# Patient Record
Sex: FEMALE | Race: WHITE | HISPANIC OR LATINO | URBAN - METROPOLITAN AREA
[De-identification: names, ages, dates, MRNs, and addresses within clinical notes are randomized per-mention and may not be internally consistent; named-entity substitution may affect disease eponyms.]

---

## 2019-11-03 ENCOUNTER — INPATIENT (INPATIENT)
Facility: HOSPITAL | Age: 49
LOS: 11 days | Discharge: HOME | End: 2019-11-15
Attending: OBSTETRICS & GYNECOLOGY | Admitting: OBSTETRICS & GYNECOLOGY
Payer: MEDICAID

## 2019-11-03 VITALS
SYSTOLIC BLOOD PRESSURE: 107 MMHG | OXYGEN SATURATION: 100 % | RESPIRATION RATE: 20 BRPM | HEART RATE: 65 BPM | TEMPERATURE: 98 F | DIASTOLIC BLOOD PRESSURE: 66 MMHG

## 2019-11-03 NOTE — ED ADULT TRIAGE NOTE - CHIEF COMPLAINT QUOTE
Pt. brought to [facility] ED by friend for abdominal pain that started Monday. Pt. was seen by PMD on monday and started on antibiotic for UTI. Pt. has become worse. Pt. is Icelandic speaking and is comfortable with friend translating. Pt. appears uncomfortable at triage.

## 2019-11-04 DIAGNOSIS — N12 TUBULO-INTERSTITIAL NEPHRITIS, NOT SPECIFIED AS ACUTE OR CHRONIC: ICD-10-CM

## 2019-11-04 DIAGNOSIS — Z98.891 HISTORY OF UTERINE SCAR FROM PREVIOUS SURGERY: Chronic | ICD-10-CM

## 2019-11-04 DIAGNOSIS — N70.93 SALPINGITIS AND OOPHORITIS, UNSPECIFIED: ICD-10-CM

## 2019-11-04 DIAGNOSIS — Z98.51 TUBAL LIGATION STATUS: Chronic | ICD-10-CM

## 2019-11-04 LAB
ALBUMIN SERPL ELPH-MCNC: 3.7 G/DL — SIGNIFICANT CHANGE UP (ref 3.3–5)
ALP SERPL-CCNC: 110 U/L — SIGNIFICANT CHANGE UP (ref 40–120)
ALT FLD-CCNC: 26 U/L — SIGNIFICANT CHANGE UP (ref 4–33)
ANION GAP SERPL CALC-SCNC: 15 MMO/L — HIGH (ref 7–14)
APPEARANCE UR: CLEAR — SIGNIFICANT CHANGE UP
APTT BLD: 29.3 SEC — SIGNIFICANT CHANGE UP (ref 27.5–36.3)
AST SERPL-CCNC: 33 U/L — HIGH (ref 4–32)
BACTERIA # UR AUTO: NEGATIVE — SIGNIFICANT CHANGE UP
BASE EXCESS BLDV CALC-SCNC: 1.4 MMOL/L — SIGNIFICANT CHANGE UP
BASOPHILS # BLD AUTO: 0.04 K/UL — SIGNIFICANT CHANGE UP (ref 0–0.2)
BASOPHILS NFR BLD AUTO: 0.4 % — SIGNIFICANT CHANGE UP (ref 0–2)
BILIRUB SERPL-MCNC: < 0.2 MG/DL — LOW (ref 0.2–1.2)
BILIRUB UR-MCNC: NEGATIVE — SIGNIFICANT CHANGE UP
BLD GP AB SCN SERPL QL: NEGATIVE — SIGNIFICANT CHANGE UP
BLOOD GAS VENOUS - CREATININE: 0.73 MG/DL — SIGNIFICANT CHANGE UP (ref 0.5–1.3)
BLOOD GAS VENOUS - FIO2: 21 — SIGNIFICANT CHANGE UP
BLOOD UR QL VISUAL: NEGATIVE — SIGNIFICANT CHANGE UP
BUN SERPL-MCNC: 9 MG/DL — SIGNIFICANT CHANGE UP (ref 7–23)
C TRACH RRNA SPEC QL NAA+PROBE: SIGNIFICANT CHANGE UP
CALCIUM SERPL-MCNC: 9.3 MG/DL — SIGNIFICANT CHANGE UP (ref 8.4–10.5)
CHLORIDE BLDV-SCNC: 104 MMOL/L — SIGNIFICANT CHANGE UP (ref 96–108)
CHLORIDE SERPL-SCNC: 98 MMOL/L — SIGNIFICANT CHANGE UP (ref 98–107)
CO2 SERPL-SCNC: 22 MMOL/L — SIGNIFICANT CHANGE UP (ref 22–31)
COLOR SPEC: YELLOW — SIGNIFICANT CHANGE UP
CREAT SERPL-MCNC: 0.71 MG/DL — SIGNIFICANT CHANGE UP (ref 0.5–1.3)
EOSINOPHIL # BLD AUTO: 0.03 K/UL — SIGNIFICANT CHANGE UP (ref 0–0.5)
EOSINOPHIL NFR BLD AUTO: 0.3 % — SIGNIFICANT CHANGE UP (ref 0–6)
FIBRINOGEN PPP-MCNC: 1049.2 MG/DL — HIGH (ref 350–510)
GAS PNL BLDV: 134 MMOL/L — LOW (ref 136–146)
GLUCOSE BLDV-MCNC: 142 MG/DL — HIGH (ref 70–99)
GLUCOSE SERPL-MCNC: 144 MG/DL — HIGH (ref 70–99)
GLUCOSE UR-MCNC: NEGATIVE — SIGNIFICANT CHANGE UP
HBV SURFACE AG SER-ACNC: NEGATIVE — SIGNIFICANT CHANGE UP
HCG SERPL-ACNC: < 5 MIU/ML — SIGNIFICANT CHANGE UP
HCG SERPL-ACNC: < 5 MIU/ML — SIGNIFICANT CHANGE UP
HCO3 BLDV-SCNC: 25 MMOL/L — SIGNIFICANT CHANGE UP (ref 20–27)
HCT VFR BLD CALC: 35.5 % — SIGNIFICANT CHANGE UP (ref 34.5–45)
HCT VFR BLDV CALC: 37.6 % — SIGNIFICANT CHANGE UP (ref 34.5–45)
HCV AB S/CO SERPL IA: 0.13 S/CO — SIGNIFICANT CHANGE UP (ref 0–0.99)
HCV AB SERPL-IMP: SIGNIFICANT CHANGE UP
HGB BLD-MCNC: 11.4 G/DL — LOW (ref 11.5–15.5)
HGB BLDV-MCNC: 12.2 G/DL — SIGNIFICANT CHANGE UP (ref 11.5–15.5)
HIV 1+2 AB+HIV1 P24 AG SERPL QL IA: SIGNIFICANT CHANGE UP
IMM GRANULOCYTES NFR BLD AUTO: 0.7 % — SIGNIFICANT CHANGE UP (ref 0–1.5)
INR BLD: 1.4 — HIGH (ref 0.88–1.17)
KETONES UR-MCNC: SIGNIFICANT CHANGE UP
LACTATE BLDV-MCNC: 1.6 MMOL/L — SIGNIFICANT CHANGE UP (ref 0.5–2)
LEUKOCYTE ESTERASE UR-ACNC: SIGNIFICANT CHANGE UP
LIDOCAIN IGE QN: 22.2 U/L — SIGNIFICANT CHANGE UP (ref 7–60)
LYMPHOCYTES # BLD AUTO: 1.34 K/UL — SIGNIFICANT CHANGE UP (ref 1–3.3)
LYMPHOCYTES # BLD AUTO: 13.7 % — SIGNIFICANT CHANGE UP (ref 13–44)
MCHC RBC-ENTMCNC: 26.1 PG — LOW (ref 27–34)
MCHC RBC-ENTMCNC: 32.1 % — SIGNIFICANT CHANGE UP (ref 32–36)
MCV RBC AUTO: 81.2 FL — SIGNIFICANT CHANGE UP (ref 80–100)
MONOCYTES # BLD AUTO: 0.69 K/UL — SIGNIFICANT CHANGE UP (ref 0–0.9)
MONOCYTES NFR BLD AUTO: 7 % — SIGNIFICANT CHANGE UP (ref 2–14)
N GONORRHOEA RRNA SPEC QL NAA+PROBE: SIGNIFICANT CHANGE UP
NEUTROPHILS # BLD AUTO: 7.62 K/UL — HIGH (ref 1.8–7.4)
NEUTROPHILS NFR BLD AUTO: 77.9 % — HIGH (ref 43–77)
NITRITE UR-MCNC: NEGATIVE — SIGNIFICANT CHANGE UP
NRBC # FLD: 0 K/UL — SIGNIFICANT CHANGE UP (ref 0–0)
PCO2 BLDV: 36 MMHG — LOW (ref 41–51)
PH BLDV: 7.46 PH — HIGH (ref 7.32–7.43)
PH UR: 5.5 — SIGNIFICANT CHANGE UP (ref 5–8)
PLATELET # BLD AUTO: 289 K/UL — SIGNIFICANT CHANGE UP (ref 150–400)
PMV BLD: 10.4 FL — SIGNIFICANT CHANGE UP (ref 7–13)
PO2 BLDV: 27 MMHG — LOW (ref 35–40)
POTASSIUM BLDV-SCNC: 3.5 MMOL/L — SIGNIFICANT CHANGE UP (ref 3.4–4.5)
POTASSIUM SERPL-MCNC: 3.7 MMOL/L — SIGNIFICANT CHANGE UP (ref 3.5–5.3)
POTASSIUM SERPL-SCNC: 3.7 MMOL/L — SIGNIFICANT CHANGE UP (ref 3.5–5.3)
PROT SERPL-MCNC: 7.7 G/DL — SIGNIFICANT CHANGE UP (ref 6–8.3)
PROT UR-MCNC: 10 — SIGNIFICANT CHANGE UP
PROTHROM AB SERPL-ACNC: 15.7 SEC — HIGH (ref 9.8–13.1)
RBC # BLD: 4.37 M/UL — SIGNIFICANT CHANGE UP (ref 3.8–5.2)
RBC # FLD: 16.2 % — HIGH (ref 10.3–14.5)
RBC CASTS # UR COMP ASSIST: SIGNIFICANT CHANGE UP (ref 0–?)
RH IG SCN BLD-IMP: POSITIVE — SIGNIFICANT CHANGE UP
SAO2 % BLDV: 47.7 % — LOW (ref 60–85)
SODIUM SERPL-SCNC: 135 MMOL/L — SIGNIFICANT CHANGE UP (ref 135–145)
SP GR SPEC: 1.02 — SIGNIFICANT CHANGE UP (ref 1–1.04)
SQUAMOUS # UR AUTO: SIGNIFICANT CHANGE UP
T PALLIDUM AB TITR SER: NEGATIVE — SIGNIFICANT CHANGE UP
UROBILINOGEN FLD QL: NORMAL — SIGNIFICANT CHANGE UP
WBC # BLD: 9.79 K/UL — SIGNIFICANT CHANGE UP (ref 3.8–10.5)
WBC # FLD AUTO: 9.79 K/UL — SIGNIFICANT CHANGE UP (ref 3.8–10.5)
WBC UR QL: HIGH (ref 0–?)

## 2019-11-04 PROCEDURE — 74177 CT ABD & PELVIS W/CONTRAST: CPT | Mod: 26

## 2019-11-04 PROCEDURE — 99222 1ST HOSP IP/OBS MODERATE 55: CPT

## 2019-11-04 RX ORDER — LEVOTHYROXINE SODIUM 125 MCG
100 TABLET ORAL DAILY
Refills: 0 | Status: DISCONTINUED | OUTPATIENT
Start: 2019-11-04 | End: 2019-11-15

## 2019-11-04 RX ORDER — SODIUM CHLORIDE 9 MG/ML
1000 INJECTION, SOLUTION INTRAVENOUS
Refills: 0 | Status: DISCONTINUED | OUTPATIENT
Start: 2019-11-04 | End: 2019-11-04

## 2019-11-04 RX ORDER — METRONIDAZOLE 500 MG
500 TABLET ORAL EVERY 8 HOURS
Refills: 0 | Status: DISCONTINUED | OUTPATIENT
Start: 2019-11-04 | End: 2019-11-09

## 2019-11-04 RX ORDER — CEFTRIAXONE 500 MG/1
1000 INJECTION, POWDER, FOR SOLUTION INTRAMUSCULAR; INTRAVENOUS ONCE
Refills: 0 | Status: COMPLETED | OUTPATIENT
Start: 2019-11-04 | End: 2019-11-04

## 2019-11-04 RX ORDER — SODIUM CHLORIDE 9 MG/ML
1000 INJECTION INTRAMUSCULAR; INTRAVENOUS; SUBCUTANEOUS ONCE
Refills: 0 | Status: COMPLETED | OUTPATIENT
Start: 2019-11-04 | End: 2019-11-04

## 2019-11-04 RX ORDER — IBUPROFEN 200 MG
600 TABLET ORAL EVERY 6 HOURS
Refills: 0 | Status: DISCONTINUED | OUTPATIENT
Start: 2019-11-04 | End: 2019-11-06

## 2019-11-04 RX ORDER — CEFOTETAN DISODIUM 1 G
2 VIAL (EA) INJECTION EVERY 12 HOURS
Refills: 0 | Status: DISCONTINUED | OUTPATIENT
Start: 2019-11-04 | End: 2019-11-09

## 2019-11-04 RX ORDER — ACETAMINOPHEN 500 MG
975 TABLET ORAL ONCE
Refills: 0 | Status: COMPLETED | OUTPATIENT
Start: 2019-11-04 | End: 2019-11-04

## 2019-11-04 RX ORDER — METRONIDAZOLE 500 MG
500 TABLET ORAL ONCE
Refills: 0 | Status: COMPLETED | OUTPATIENT
Start: 2019-11-04 | End: 2019-11-04

## 2019-11-04 RX ORDER — ACETAMINOPHEN 500 MG
975 TABLET ORAL EVERY 6 HOURS
Refills: 0 | Status: DISCONTINUED | OUTPATIENT
Start: 2019-11-04 | End: 2019-11-06

## 2019-11-04 RX ORDER — ENOXAPARIN SODIUM 100 MG/ML
40 INJECTION SUBCUTANEOUS DAILY
Refills: 0 | Status: DISCONTINUED | OUTPATIENT
Start: 2019-11-04 | End: 2019-11-06

## 2019-11-04 RX ORDER — MORPHINE SULFATE 50 MG/1
4 CAPSULE, EXTENDED RELEASE ORAL ONCE
Refills: 0 | Status: DISCONTINUED | OUTPATIENT
Start: 2019-11-04 | End: 2019-11-04

## 2019-11-04 RX ORDER — ONDANSETRON 8 MG/1
4 TABLET, FILM COATED ORAL ONCE
Refills: 0 | Status: COMPLETED | OUTPATIENT
Start: 2019-11-04 | End: 2019-11-04

## 2019-11-04 RX ORDER — SIMETHICONE 80 MG/1
80 TABLET, CHEWABLE ORAL DAILY
Refills: 0 | Status: DISCONTINUED | OUTPATIENT
Start: 2019-11-04 | End: 2019-11-06

## 2019-11-04 RX ORDER — CEFOTETAN DISODIUM 1 G
2 VIAL (EA) INJECTION ONCE
Refills: 0 | Status: COMPLETED | OUTPATIENT
Start: 2019-11-04 | End: 2019-11-04

## 2019-11-04 RX ADMIN — MORPHINE SULFATE 4 MILLIGRAM(S): 50 CAPSULE, EXTENDED RELEASE ORAL at 01:03

## 2019-11-04 RX ADMIN — SIMETHICONE 80 MILLIGRAM(S): 80 TABLET, CHEWABLE ORAL at 16:54

## 2019-11-04 RX ADMIN — Medication 110 MILLIGRAM(S): at 22:46

## 2019-11-04 RX ADMIN — ENOXAPARIN SODIUM 40 MILLIGRAM(S): 100 INJECTION SUBCUTANEOUS at 22:46

## 2019-11-04 RX ADMIN — Medication 975 MILLIGRAM(S): at 17:09

## 2019-11-04 RX ADMIN — Medication 100 GRAM(S): at 17:08

## 2019-11-04 RX ADMIN — Medication 100 MILLIGRAM(S): at 09:30

## 2019-11-04 RX ADMIN — Medication 100 GRAM(S): at 05:15

## 2019-11-04 RX ADMIN — Medication 100 MICROGRAM(S): at 21:03

## 2019-11-04 RX ADMIN — ONDANSETRON 4 MILLIGRAM(S): 8 TABLET, FILM COATED ORAL at 01:03

## 2019-11-04 RX ADMIN — Medication 100 MILLIGRAM(S): at 17:47

## 2019-11-04 RX ADMIN — CEFTRIAXONE 100 MILLIGRAM(S): 500 INJECTION, POWDER, FOR SOLUTION INTRAMUSCULAR; INTRAVENOUS at 03:54

## 2019-11-04 RX ADMIN — Medication 975 MILLIGRAM(S): at 17:39

## 2019-11-04 RX ADMIN — Medication 975 MILLIGRAM(S): at 03:53

## 2019-11-04 RX ADMIN — Medication 110 MILLIGRAM(S): at 10:34

## 2019-11-04 RX ADMIN — SODIUM CHLORIDE 1000 MILLILITER(S): 9 INJECTION INTRAMUSCULAR; INTRAVENOUS; SUBCUTANEOUS at 01:03

## 2019-11-04 RX ADMIN — MORPHINE SULFATE 4 MILLIGRAM(S): 50 CAPSULE, EXTENDED RELEASE ORAL at 03:53

## 2019-11-04 NOTE — H&P ADULT - HISTORY OF PRESENT ILLNESS
Patient decline  and is using her friend who is bedside for Hebrew translation    50 yo  LMP 11/3 coming in with abdominal pain, fevers for one week, and dysuria. Patient came from [facility] three days ago and was recently diagnosed with pyelonephritis there. She had been placed on Keflex and states that she still has 10 pills left. She has been having fevers at home even while taking tylenol and her abdominal pain especially in the right has not improved. She denies a change in vaginal discharge and her period started yesterday. She also complains of chills and feeling weak. She denies CP, SOB, n/v. She last ate at 9pm.    OBHx: NSVDx3  GynHx: Has a hx of fibroids. Hx of abnormal pap smears, most recent wnl. Denies STIs, ovarian cysts, endometriosis Unsure if her partner may have STI but she is monogamous. S/p tubal ligation   PMHx: Hypothyroidism on levothyroxine 100mcg  All: NKDA

## 2019-11-04 NOTE — H&P ADULT - ASSESSMENT
48 yo  LMP 11/3 p/w bl salpingitis and TOA with additional pelvic collections noted. Patient is febrile and tachycardic but has a normal WBC. Admit for IV antibiotics and plan for possible IR drainage. All questions answered and patient in agreement with plan.

## 2019-11-04 NOTE — ED PROVIDER NOTE - ATTENDING CONTRIBUTION TO CARE
49F hx hypothyroid on synthroid p/w 1d severe diffuse nonrad constant atraumatic abd pain assoc w/nausea & anorexia. No  sx.  Pt was dx w/UTI & put on abx recently. No sx similar before.  No fevers.  No CP, SOB.  Prior C-sections. No vag bleed or d/c. Last BM today but was small.  Nl BM yesterday.      VS: afebrile, others reassuring   Gen: nontoxic yet in pain  Head: NC/AT  HEENT: dry mucous membranes   Neck: trachea midline  Resp:  No distress  abd: soft, nondistended, diffusely tender worst supraumbilical w/guarding   Ext: no deformities  Neuro:  A&Ox3  Skin:  Warm and dry as visualized  Psych:  appropriate affect and mood    MDM: Abd pain most c/w SBO (Other DDx includes pancreatitis, abx-associated gastritis, gastroenteritis, perf viscous, FB, IBD, volvulus,  pathology such as torsion, appy, masses)  Plan: 1) IV access/IVF/LABS/Lactate.  2) CT A/P w/po&iv contrast.  3) Pain control/antiemetics as needed/ngt if necessary.  4) reassess.  5) Surgical consultation if necessary 49F hx hypothyroid on synthroid p/w 1d severe diffuse nonrad constant atraumatic abd pain assoc w/nausea & anorexia. Mild residual dysuria.  Pt was dx w/UTI & put on abx recently. No sx similar before.  No fevers.  No CP, SOB.  Prior C-sections. No vag bleed or d/c. Last BM today but was small.  Nl BM yesterday.      VS: afebrile, others reassuring   Gen: nontoxic yet in pain  Head: NC/AT  HEENT: dry mucous membranes   Neck: trachea midline  Resp:  No distress  abd: soft, nondistended, diffusely tender worst supraumbilical w/guarding   Ext: no deformities  Neuro:  A&Ox3  Skin:  Warm and dry as visualized  Psych:  appropriate affect and mood    MDM: Abd pain in HD stable well appearing pt most c/w SBO (Other DDx includes pancreatitis, abx-associated gastritis, gastroenteritis, perf viscous, FB, IBD, volvulus,  pathology such as torsion, appy, masses)  Plan: 1) IV access/IVF/LABS/Lactate.  2) CT A/P w/po&iv contrast.  3) Pain control/antiemetics as needed/ngt if necessary.  4) reassess.  5) Surgical consultation if necessary

## 2019-11-04 NOTE — ED PROVIDER NOTE - PROGRESS NOTE DETAILS
Patient discussed with OB for findings of tubo-ovarian abscesses, broadening antibiosis, patient stable at this time. GILL Petersen, EM PGY3 Patient stable, OB to bedside, antibiosis broadened, pain medications readministered. GILL Petersen, ALESSANDRO PGY3

## 2019-11-04 NOTE — PROGRESS NOTE ADULT - SUBJECTIVE AND OBJECTIVE BOX
Gyn Attg  Pt transferred to 4T   phone used [phone]  Pt reports having mild-moderate diffuse abdominal pain and frontal HA.  She denies chills or nausea and vomiting  Vital Signs Last 24 Hrs  T(C): 37.1 (04 Nov 2019 18:09), Max: 38.3 (04 Nov 2019 03:46)  T(F): 98.7 (04 Nov 2019 18:09), Max: 101 (04 Nov 2019 03:46)  HR: 96 (04 Nov 2019 18:09) (65 - 105)  BP: 104/54 (04 Nov 2019 18:09) (92/53 - 122/35)  BP(mean): --  RR: 17 (04 Nov 2019 18:09) (16 - 97)  SpO2: 98% (04 Nov 2019 18:09) (98% - 100%)  I&O's Summary    04 Nov 2019 07:01  -  04 Nov 2019 18:54  --------------------------------------------------------  IN: 725 mL / OUT: 400 mL / NET: 325 mL    Abd-distended, soft, mildly tender, no rebound or guarding  Ext NT  TOA HD#1 with right ovarian vein thrombosis  IR consult--collection not drainable  Continue IV ABx and close monitoring for improvement  Start Lovenox for Rt ovarian vein thrombosis

## 2019-11-04 NOTE — H&P ADULT - NSHPSOCIALHISTORY_GEN_ALL_CORE
Is currently staying with friends/family. She primarily lives in [facility] with her . She denies toxic exposures. She denies hx of STIs.

## 2019-11-04 NOTE — PROVIDER CONTACT NOTE (MEDICATION) - ACTION/TREATMENT ORDERED:
MD made aware. Stated it was fine for patient to take dose now and then her next dose in the morning.

## 2019-11-04 NOTE — ED ADULT NURSE NOTE - OBJECTIVE STATEMENT
Pipo RN: Received pt in room 10. Pt A&Ox3, Angolan speaking, family at bedside translating. Pt brought into ED by friend for abdominal pain that started Monday. Pt. was seen by PMD on Monday and started on antibiotic for UTI. Pt. has become worse. Pt appears uncomfortable, guarding abdomen but in no apparent distress. Pt reports intermittent fevers x 1 week. Pt also reports slight constipation, last BM today. Pt afebrile orally at this time. Vitals as noted. Respirations are equal and nonlabored, no respiratory distress noted. Abdomen hard, distended, and tender to touch in all 4 quadrants. Denies chest pain, sob, n/v/d, dizziness, headache, cough. 20 gauge IV placed in the left AC, labs sent. Pt stable. will endorse report to primary MADDIE Hutton for further plan

## 2019-11-04 NOTE — ED ADULT NURSE NOTE - NSIMPLEMENTINTERV_GEN_ALL_ED
Implemented All Universal Safety Interventions:  [place] to call system. Call bell, personal items and telephone within reach. Instruct patient to call for assistance. Room bathroom lighting operational. Non-slip footwear when patient is off stretcher. Physically safe environment: no spills, clutter or unnecessary equipment. Stretcher in lowest position, wheels locked, appropriate side rails in place.

## 2019-11-04 NOTE — H&P ADULT - NSHPLABSRESULTS_GEN_ALL_CORE
11.4                  Neurophils% (auto):   77.9   (11-03 @ 23:58):    9.79 )-----------(289          Lymphocytes% (auto):  13.7                                          35.5                   Eosinphils% (auto):   0.3      Manual%: Neutrophils x    ; Lymphocytes x    ; Eosinophils x    ; Bands%: x    ; Blasts x          11-03    135  |  98  |  9   ----------------------------<  144<H>  3.7   |  22  |  0.71    Ca    9.3      03 Nov 2019 23:58    TPro  7.7  /  Alb  3.7  /  TBili  < 0.2<L>  /  DBili  x   /  AST  33<H>  /  ALT  26  /  AlkPhos  110  11-03        VBG - ( 03 Nov 2019 23:58 )  pH: 7.46  /  pCO2: 36    /  pO2: 27    / HCO3: 25    / Base Excess: 1.4   /  SvO2: 47.7  / Lactate: 1.6      RECENT CULTURES:    < from: CT Abdomen and Pelvis w/ Oral Cont and w/ IV Cont (11.04.19 @ 03:37) >    EXAM:  CT ABDOMEN AND PELVIS OC IC        PROCEDURE DATE:  Nov 4 2019         INTERPRETATION:  CLINICAL INFORMATION: Diffuse abdominal pain    COMPARISON: None.    PROCEDURE:   CT of the Abdomen and Pelvis was performed with intravenous contrast.   Intravenous contrast: 90 ml Omnipaque 350. 10 ml discarded.  Oral contrast: positive contrast was administered.  Sagittal and coronal reformats were performed.    FINDINGS:    LOWER CHEST: Within normal limits    LIVER: Hypodense foci measuring 5 mmin segment 6 (2:53) and 4 mm in   segment 7 (2:17) are too small to characterize by CT scan.  BILE DUCTS: Normal caliber.  GALLBLADDER: Within normal limits.  SPLEEN: Within normal limits.  PANCREAS: Within normal limits.  ADRENALS: Within normal limits.  KIDNEYS/URETERS: Within normal limits.    BLADDER: Within normal limits.  REPRODUCTIVE ORGANS: There are dilated thick-walled and hyperemic   fallopian tubes with fluid collections bilaterally compatible with   salpingitis and tubo-ovarian abscesses.    A partly myometrial partly subserosal fibroid  A myometrial fibroid with small submucosal component in the anterior body   of uterus measures 2.5 cm (601:71).  A myometrial fibroid in the anterior right lower uterine segment measures   approximately 2 cm (601:67).    BOWEL: No bowel obstruction. Appendix is normal.  PERITONEUM: There is complex free fluid in the pelvis with subtle   peritoneal enhancement in the cul-de-sac, indicating peritonitis.  There   is mild abdominal ascites in the paracolic bladders bilaterally, around   the tip of the spleen and in the left subdiaphragmatic space.  VESSELS: Large filling defect in the proximal (inferior) aspect of the   right ovarian vein is compatible with acute thrombosis.  RETROPERITONEUM/LYMPH NODES: No lymphadenopathy.    ABDOMINAL WALL: Within normal limits.  BONES: Within normal limits.    IMPRESSION:     Bilateral salpingitis and tubo-ovarian abscesses with evidence of   peritonitis.  Acute thrombosis of the inferior aspect of theright   ovarian vein, for which anticoagulation may be warranted.    CRITICAL VALUE: Dr. Ortiz from radiology discussed the major findings   in this report with Dr. Means on 11/04/2019 at 4:30 AM.  Critical   value policy of the hospital was followed. Read back and confirmation of   receipt of this communication was performed. This verbal communication   supplements the text report of this document.    PHILLIP ORTIZ M.D., RADIOLOGY RESIDENT  This document has been electronically signed.  ABRAM QUINONES M.D.,ATTENDING RADIOLOGIST  This document has been electronically signed. Nov 4 2019  4:31AM

## 2019-11-04 NOTE — H&P ADULT - PROBLEM SELECTOR PLAN 1
-admit to Dr. Limon  -IV cefotetan, flagyl, doxy  -LR@125  -NPO  -coags, T&S  -STI screen: GC, HIV, syphilis, hepatitis B&C  -blood and urine cultures      Patient seen and d/w Dr. Kenyon Vasquez PGY-2

## 2019-11-04 NOTE — ED PROVIDER NOTE - OBJECTIVE STATEMENT
49F hx hypothyroid on synthroid p/w 1d severe diffuse nonrad constant atraumatic abd pain assoc w/nausea & anorexia. Hx of c-sx in the past, did have a bm today but was small and hard, no chest pain, back pain, headache, leg ache, or other new symptoms of concern. Has no hx of pain like this in the past, has never had a kidney infection before.

## 2019-11-04 NOTE — ED ADULT NURSE NOTE - CHIEF COMPLAINT QUOTE
Pt. brought to [facility] ED by friend for abdominal pain that started Monday. Pt. was seen by PMD on monday and started on antibiotic for UTI. Pt. has become worse. Pt. is Yi speaking and is comfortable with friend translating. Pt. appears uncomfortable at triage.

## 2019-11-04 NOTE — ED PROVIDER NOTE - PHYSICAL EXAMINATION
Gen: NAD, non-toxic, conversational  Eyes: PERRL, EOMI   HENT: Normocephalic, atraumatic. External ears normal, no rhinorrhea, moist mucous membranes.   CV: RRR, no M/R/G  Resp: CTAB, non-labored, speaking without difficulty on room air  Abd: soft, diffuse tenderness, guarding but non rigid, no rebound tenderness  Back: No CVAT bilaterally, no midline ttp  Skin: dry, wwp   Neuro: AOx3, speech is fluent and appropriate  Psych: Mood good, affect euthymic

## 2019-11-04 NOTE — PROVIDER CONTACT NOTE (MEDICATION) - SITUATION
Patient missed morning dose of synthroid, can she take the dose now and then her next dose in morning?

## 2019-11-04 NOTE — H&P ADULT - ATTENDING COMMENTS
Pt seen and examined.  Significant RLQ tenderness appreciated.  Discussed with patient in Botswanan that she has an infection in her fallopian tubes and that she will be admitted and treated with antibiotics.  Explained that we may also explore drainage via interventional radiology and that in the event that she does not clinically improve, she will require a larger surgery.  Will start antibiotics, consult IR and follow up labs and cultures.

## 2019-11-04 NOTE — ED PROVIDER NOTE - CLINICAL SUMMARY MEDICAL DECISION MAKING FREE TEXT BOX
49F presenting for evaluation of abdominal pain, on exam diffusely ttp, recent dx of uti on abx uncertain which ones, hard bm today was small, will treat pain, check labs, ct, follow up studies, reassess, dispo.

## 2019-11-04 NOTE — H&P ADULT - NSHPPHYSICALEXAM_GEN_ALL_CORE
Physical Exam:   General: sitting comfortably in bed, NAD   CV: tachycardic, S1S2  Lungs: CTA b/l, good air flow b/l   Back: No CVA tenderness  Abd: significant tenderness in the RLQ and periumbilical regions, mild generalized tenderness, normal bowel sounds  Speculum: Scant vaginal bleeding, dark red blood in vault. Malodorous discharge noted. GC endocervical culture collected. Cervix closed.  Pelvic: no CMT. Significant R adnexal tenderness. No masses felt. Cervix closed.  Ext: NT b/l, no edema

## 2019-11-05 ENCOUNTER — TRANSCRIPTION ENCOUNTER (OUTPATIENT)
Age: 49
End: 2019-11-05

## 2019-11-05 LAB
BACTERIA UR CULT: SIGNIFICANT CHANGE UP
BASOPHILS # BLD AUTO: 0.04 K/UL — SIGNIFICANT CHANGE UP (ref 0–0.2)
BASOPHILS # BLD AUTO: 0.05 K/UL — SIGNIFICANT CHANGE UP (ref 0–0.2)
BASOPHILS NFR BLD AUTO: 0.2 % — SIGNIFICANT CHANGE UP (ref 0–2)
BASOPHILS NFR BLD AUTO: 0.3 % — SIGNIFICANT CHANGE UP (ref 0–2)
EOSINOPHIL # BLD AUTO: 0.02 K/UL — SIGNIFICANT CHANGE UP (ref 0–0.5)
EOSINOPHIL # BLD AUTO: 0.03 K/UL — SIGNIFICANT CHANGE UP (ref 0–0.5)
EOSINOPHIL NFR BLD AUTO: 0.1 % — SIGNIFICANT CHANGE UP (ref 0–6)
EOSINOPHIL NFR BLD AUTO: 0.2 % — SIGNIFICANT CHANGE UP (ref 0–6)
HCT VFR BLD CALC: 31.2 % — LOW (ref 34.5–45)
HCT VFR BLD CALC: 32.8 % — LOW (ref 34.5–45)
HGB BLD-MCNC: 10 G/DL — LOW (ref 11.5–15.5)
HGB BLD-MCNC: 10.4 G/DL — LOW (ref 11.5–15.5)
IMM GRANULOCYTES NFR BLD AUTO: 0.7 % — SIGNIFICANT CHANGE UP (ref 0–1.5)
IMM GRANULOCYTES NFR BLD AUTO: 0.7 % — SIGNIFICANT CHANGE UP (ref 0–1.5)
LYMPHOCYTES # BLD AUTO: 0.95 K/UL — LOW (ref 1–3.3)
LYMPHOCYTES # BLD AUTO: 1.39 K/UL — SIGNIFICANT CHANGE UP (ref 1–3.3)
LYMPHOCYTES # BLD AUTO: 5 % — LOW (ref 13–44)
LYMPHOCYTES # BLD AUTO: 7.6 % — LOW (ref 13–44)
MCHC RBC-ENTMCNC: 26.1 PG — LOW (ref 27–34)
MCHC RBC-ENTMCNC: 26.2 PG — LOW (ref 27–34)
MCHC RBC-ENTMCNC: 31.7 % — LOW (ref 32–36)
MCHC RBC-ENTMCNC: 32.1 % — SIGNIFICANT CHANGE UP (ref 32–36)
MCV RBC AUTO: 81.7 FL — SIGNIFICANT CHANGE UP (ref 80–100)
MCV RBC AUTO: 82.2 FL — SIGNIFICANT CHANGE UP (ref 80–100)
MONOCYTES # BLD AUTO: 0.84 K/UL — SIGNIFICANT CHANGE UP (ref 0–0.9)
MONOCYTES # BLD AUTO: 0.95 K/UL — HIGH (ref 0–0.9)
MONOCYTES NFR BLD AUTO: 4.6 % — SIGNIFICANT CHANGE UP (ref 2–14)
MONOCYTES NFR BLD AUTO: 5 % — SIGNIFICANT CHANGE UP (ref 2–14)
NEUTROPHILS # BLD AUTO: 15.92 K/UL — HIGH (ref 1.8–7.4)
NEUTROPHILS # BLD AUTO: 16.76 K/UL — HIGH (ref 1.8–7.4)
NEUTROPHILS NFR BLD AUTO: 86.7 % — HIGH (ref 43–77)
NEUTROPHILS NFR BLD AUTO: 88.9 % — HIGH (ref 43–77)
NRBC # FLD: 0 K/UL — SIGNIFICANT CHANGE UP (ref 0–0)
NRBC # FLD: 0 K/UL — SIGNIFICANT CHANGE UP (ref 0–0)
PLATELET # BLD AUTO: 275 K/UL — SIGNIFICANT CHANGE UP (ref 150–400)
PLATELET # BLD AUTO: 277 K/UL — SIGNIFICANT CHANGE UP (ref 150–400)
PMV BLD: 10.4 FL — SIGNIFICANT CHANGE UP (ref 7–13)
PMV BLD: 10.6 FL — SIGNIFICANT CHANGE UP (ref 7–13)
RBC # BLD: 3.82 M/UL — SIGNIFICANT CHANGE UP (ref 3.8–5.2)
RBC # BLD: 3.99 M/UL — SIGNIFICANT CHANGE UP (ref 3.8–5.2)
RBC # FLD: 16.2 % — HIGH (ref 10.3–14.5)
RBC # FLD: 16.3 % — HIGH (ref 10.3–14.5)
SPECIMEN SOURCE: SIGNIFICANT CHANGE UP
WBC # BLD: 163.22 K/UL — CRITICAL HIGH (ref 3.8–10.5)
WBC # BLD: 18.87 K/UL — HIGH (ref 3.8–10.5)
WBC # FLD AUTO: 163.22 K/UL — CRITICAL HIGH (ref 3.8–10.5)
WBC # FLD AUTO: 18.87 K/UL — HIGH (ref 3.8–10.5)

## 2019-11-05 PROCEDURE — 99232 SBSQ HOSP IP/OBS MODERATE 35: CPT | Mod: GC

## 2019-11-05 RX ADMIN — Medication 100 GRAM(S): at 17:15

## 2019-11-05 RX ADMIN — Medication 975 MILLIGRAM(S): at 21:52

## 2019-11-05 RX ADMIN — Medication 100 MILLIGRAM(S): at 08:06

## 2019-11-05 RX ADMIN — Medication 110 MILLIGRAM(S): at 10:37

## 2019-11-05 RX ADMIN — Medication 600 MILLIGRAM(S): at 01:02

## 2019-11-05 RX ADMIN — Medication 100 MILLIGRAM(S): at 01:02

## 2019-11-05 RX ADMIN — Medication 100 GRAM(S): at 05:21

## 2019-11-05 RX ADMIN — Medication 975 MILLIGRAM(S): at 22:23

## 2019-11-05 RX ADMIN — Medication 600 MILLIGRAM(S): at 01:35

## 2019-11-05 RX ADMIN — Medication 100 MICROGRAM(S): at 07:28

## 2019-11-05 RX ADMIN — Medication 100 MILLIGRAM(S): at 17:15

## 2019-11-05 RX ADMIN — ENOXAPARIN SODIUM 40 MILLIGRAM(S): 100 INJECTION SUBCUTANEOUS at 11:33

## 2019-11-05 RX ADMIN — Medication 110 MILLIGRAM(S): at 22:11

## 2019-11-05 RX ADMIN — SIMETHICONE 80 MILLIGRAM(S): 80 TABLET, CHEWABLE ORAL at 11:33

## 2019-11-05 NOTE — PROGRESS NOTE ADULT - SUBJECTIVE AND OBJECTIVE BOX
Gyn Progress Note HD#2    Subjective:   Pt seen and examined at bedside. No events overnight. Pain has improved and she feels better overall. Patient ambulating to bathroom and in room. Passing flatus and had one episode of loose stools overnight. Tolerating regular diet. Pt denies fever, chills, chest pain, SOB, nausea, vomiting, lightheadedness, dizziness.      Objective:  T(F): 98.6 (19 @ 05:15), Max: 100.2 (19 @ 13:10)  HR: 80 (19 @ 05:15) (80 - 99)  BP: 92/54 (19 @ 05:15) (92/53 - 105/55)  RR: 17 (19 @ 05:15) (16 - 18)  SpO2: 99% (19 @ 05:15) (98% - 99%)    I&O's Summary    2019 07:01  -  2019 07:00  --------------------------------------------------------  IN: 725 mL / OUT: 400 mL / NET: 325 mL      MEDICATIONS  (STANDING):  cefoTEtan  IVPB 2 Gram(s) IV Intermittent every 12 hours  doxycycline IVPB 100 milliGRAM(s) IV Intermittent every 12 hours  enoxaparin Injectable 40 milliGRAM(s) SubCutaneous daily  levothyroxine 100 MICROGram(s) Oral daily  metroNIDAZOLE  IVPB 500 milliGRAM(s) IV Intermittent every 8 hours  simethicone 80 milliGRAM(s) Chew daily    MEDICATIONS  (PRN):  acetaminophen   Tablet .. 975 milliGRAM(s) Oral every 6 hours PRN Mild Pain (1 - 3)  ibuprofen  Tablet. 600 milliGRAM(s) Oral every 6 hours PRN Moderate Pain (4 - 6)      Physical Exam:  Constitutional: NAD, A+O x3  CV: RRR  Lungs: clear to auscultation bilaterally  Abdomen: soft, mildly distended, diffusely tender to palpation, no guarding, no rebound, hypoactive bowel sounds  Extremities: no lower extremity edema or calf tenderness bilaterally; venodynes in place    LABS:        135    |  98     |  9      ----------------------------<  144<H>  3.7     |  22     |  0.71     Ca    9.3        2019 23:58    TPro  7.7    /  Alb  3.7    /  TBili  < 0.2<L>  /  DBili  x      /  AST  33<H>  /  ALT  26     /  AlkPhos  110    1103    PT/INR - ( 2019 11:00 )   PT: 15.7 SEC;   INR: 1.40       PTT - ( 2019 11:00 )  PTT:29.3 SEC    Urinalysis Basic - ( 2019 02:13 )    Color: YELLOW / Appearance: CLEAR / S.020 / pH: 5.5  Gluc: NEGATIVE / Ketone: TRACE  / Bili: NEGATIVE / Urobili: NORMAL   Blood: NEGATIVE / Protein: 10 / Nitrite: NEGATIVE   Leuk Esterase: LARGE / RBC: 0-2 / WBC 11-25   Sq Epi: OCC / Non Sq Epi: x / Bacteria: NEGATIVE Gyn Progress Note HD#2    Subjective:   Pt seen and examined at bedside. No events overnight. Pain has improved and she feels better overall. Patient ambulating to bathroom and in room. Passing flatus and had one episode of loose stools overnight. Tolerating regular diet. Pt denies fever, chills, chest pain, SOB, nausea, vomiting, lightheadedness, dizziness.      Objective:  T(F): 98.6 (19 @ 05:15), Max: 100.2 (19 @ 13:10)  HR: 80 (19 @ 05:15) (80 - 99)  BP: 92/54 (19 @ 05:15) (92/53 - 105/55)  RR: 17 (19 @ 05:15) (16 - 18)  SpO2: 99% (19 @ 05:15) (98% - 99%)    I&O's Summary    2019 07:01  -  2019 07:00  --------------------------------------------------------  IN: 725 mL / OUT: 400 mL / NET: 325 mL      MEDICATIONS  (STANDING):  cefoTEtan  IVPB 2 Gram(s) IV Intermittent every 12 hours  doxycycline IVPB 100 milliGRAM(s) IV Intermittent every 12 hours  enoxaparin Injectable 40 milliGRAM(s) SubCutaneous daily  levothyroxine 100 MICROGram(s) Oral daily  metroNIDAZOLE  IVPB 500 milliGRAM(s) IV Intermittent every 8 hours  simethicone 80 milliGRAM(s) Chew daily    MEDICATIONS  (PRN):  acetaminophen   Tablet .. 975 milliGRAM(s) Oral every 6 hours PRN Mild Pain (1 - 3)  ibuprofen  Tablet. 600 milliGRAM(s) Oral every 6 hours PRN Moderate Pain (4 - 6)      Physical Exam:  Constitutional: NAD, A+O x3  CV: RRR  Lungs: clear to auscultation bilaterally  Abdomen: softly distended, mild diffuse pelvic tenderness to palpation, no guarding, no rebound, hypoactive bowel sounds  Extremities: no lower extremity edema or calf tenderness bilaterally; venodynes in place    LABS:        135    |  98     |  9      ----------------------------<  144<H>  3.7     |  22     |  0.71     Ca    9.3        2019 23:58    TPro  7.7    /  Alb  3.7    /  TBili  < 0.2<L>  /  DBili  x      /  AST  33<H>  /  ALT  26     /  AlkPhos  110    1103    PT/INR - ( 2019 11:00 )   PT: 15.7 SEC;   INR: 1.40       PTT - ( 2019 11:00 )  PTT:29.3 SEC    Urinalysis Basic - ( 2019 02:13 )    Color: YELLOW / Appearance: CLEAR / S.020 / pH: 5.5  Gluc: NEGATIVE / Ketone: TRACE  / Bili: NEGATIVE / Urobili: NORMAL   Blood: NEGATIVE / Protein: 10 / Nitrite: NEGATIVE   Leuk Esterase: LARGE / RBC: 0-2 / WBC 11-25   Sq Epi: OCC / Non Sq Epi: x / Bacteria: NEGATIVE

## 2019-11-05 NOTE — DISCHARGE NOTE PROVIDER - HOSPITAL COURSE
Patient was admitted to the hospital on 11/4/19 for bilateral salpingitis, tubo-ovarian abscesses and a pelvic collection in her posterior cul-de-sac. She was started on cefotetan, flagyl and doxycycline. Upon discussion with Interventional Radiology, it was determined her pelvic collection was not amenable to ultrasound-guided drainage. On HD#2, her WBC increased to 18 but the patient remained afebrile and demonstrated no peritoneal signs on exam. The patient was continued on IV antibiotics and demonstrated clinical improvement. *** Patient was admitted to the hospital on 11/4/19 for bilateral salpingitis, tubo-ovarian abscesses and a pelvic collection in her posterior cul-de-sac. She was started on IV cefotetan, IV flagyl and IV doxycycline. Upon discussion with Interventional Radiology, it was determined her pelvic collection was not amenable to ultrasound-guided drainage. Patient was also found to have a right ovarian vein thrombosis, for which she was started on Lovenox 40mg daily on 11/4. On HD#2, her WBC increased to 18 but the patient remained afebrile and demonstrated no peritoneal signs on exam. The patient was continued on IV antibiotics and demonstrated clinical improvement. On HD#3, patient was febrile and demonstrated clinical signs of disease progression. Decision was made to go to the OR urgently. Patient underwent an abdominal hysterectomy and bilateral salpingo-oopherectomy, removal of diseased tissue and bilateral ureterolysis. Intraoperative fluid cultures grew ESBL. Infectious disease was consulted and patient's medication regimen was changed to *** Patient was admitted to the hospital on 11/4/19 for bilateral salpingitis, tubo-ovarian abscesses and a pelvic collection in her posterior cul-de-sac. She was started on IV cefotetan, IV flagyl and IV doxycycline. Upon discussion with Interventional Radiology, it was determined her pelvic collection was not amenable to ultrasound-guided drainage. Patient was also found to have a right ovarian vein thrombosis, for which she was started on Lovenox 40mg daily on 11/4. On HD#2, her WBC increased to 18 but the patient remained afebrile and demonstrated no peritoneal signs on exam. The patient was continued on IV antibiotics and demonstrated clinical improvement. On HD#3, patient was febrile and demonstrated clinical signs of disease progression. Decision was made to go to the OR urgently. Patient underwent an abdominal hysterectomy and bilateral salpingo-oopherectomy, removal of diseased tissue and bilateral ureterolysis. Intraoperative fluid cultures grew ESBL. Infectious disease was consulted and patient's medication regimen was changed to ertapenem IV and she was continued on PO doxycycline. She was discharged home with PO doxy until 11/17.         Follow-up with us in clinic in 2 weeks, on November 26 at 5pm.        Valdes regina de seguimiento con nosotros es en 2 semanas, el 26 de noviembre a las 5pm. Patient was admitted to the hospital on 11/4/19 for bilateral salpingitis, tubo-ovarian abscesses and a pelvic collection in her posterior cul-de-sac. She was started on IV cefotetan, IV flagyl and IV doxycycline. Upon discussion with Interventional Radiology, it was determined her pelvic collection was not amenable to ultrasound-guided drainage. Patient was also found to have a right ovarian vein thrombosis, for which she was started on Lovenox 40mg daily on 11/4. On HD#2, her WBC increased to 18 but the patient remained afebrile and demonstrated no peritoneal signs on exam. The patient was continued on IV antibiotics and demonstrated clinical improvement. On HD#3, patient was febrile and demonstrated clinical signs of disease progression. Decision was made to go to the OR urgently. Patient underwent an abdominal hysterectomy and bilateral salpingo-oopherectomy, removal of diseased tissue and bilateral ureterolysis. Intraoperative fluid cultures grew ESBL. Infectious disease was consulted and patient's medication regimen was changed to ertapenem IV and she was continued on PO doxycycline. She was discharged home with PO doxy until 11/17.         Follow-up with us in clinic in 2 weeks, on November 25 at 4:30pm.        Valdes regina de seguimiento con nosotros es en 2 semanas, el 25 de noviembre a las 4:30pm.

## 2019-11-05 NOTE — DISCHARGE NOTE PROVIDER - NSDCACTIVITY_GEN_ALL_CORE
Showering allowed/Walking - Indoors allowed/Walking - Outdoors allowed/No heavy lifting/straining/Stairs allowed/Driving allowed

## 2019-11-05 NOTE — DISCHARGE NOTE PROVIDER - CARE PROVIDER_API CALL
[facility],   [facility]  Oncology Building, 3rd floor    [facility]  [address]  Phone: [phone]  Fax: (   )    -  Follow Up Time:

## 2019-11-05 NOTE — PROGRESS NOTE ADULT - PROBLEM SELECTOR PLAN 1
CV: Hemodynamically stable  Pulm: Saturating well on RA. Increase incentive spirometry.  GI: Tolerating regular diet  : Voiding spontaneously  ID: Bilateral salpingitis and TOAs. Pelvic collection likely phlegmon and not drainable at this time. Continue cefotetan (11/4-), flagyl (11/4-), doxycycline (11/4-).   Heme: F/u AM CBC. Continue HSQ/Lovenox/Venodynes for DVT ppx. Increase OOB.    Neuro: Continue oral meds for pain control    Nishi Kline, PGY-1 CV: Hemodynamically stable  Pulm: Saturating well on RA. Increase incentive spirometry.  GI: Tolerating regular diet  : Voiding spontaneously  ID: Bilateral salpingitis and TOAs. Pelvic collection likely phlegmon and not drainable at this time. Continue cefotetan (11/4-), flagyl (11/4-), doxycycline (11/4-).   Heme: F/u AM CBC. Continue Lovenox for ovarian vein thrombosis and Venodynes for DVT ppx. Increase OOB.    Endo: h/o hypothyroidism. Continue levothyroxine.  Neuro: Continue oral meds for pain control    Nishi Kline, PGY-1 CV: Hemodynamically stable; continue q4h VS  Pulm: Saturating well on RA. Increase incentive spirometry.  GI: Tolerating regular diet; SLIV  : Voiding spontaneously; strict I/Os  ID: Bilateral salpingitis and TOAs. Pelvic collection likely phlegmon and not drainable at this time per IR. Continue cefotetan (11/4-), flagyl (11/4-), doxycycline (11/4-). f/u AM WBC.  Heme: F/u AM CBC. Continue Lovenox for ovarian vein thrombosis and Venodynes for DVT ppx. Increase OOB.    Endo: Continue levothyroxine for pmh hypothyroid  Neuro: Continue oral meds for pain control    Nishi Kline, PGY-1

## 2019-11-05 NOTE — DISCHARGE NOTE PROVIDER - NSDCFUADDINST_GEN_ALL_CORE_FT
Return to your regular way of eating.  Resume normal activity as tolerated, but no heavy lifting or strenuous activity for 6 weeks.  No driving for next 2 weeks and/or while on narcotic pain medication.  Complete vaginal rest, no tampons, no douching, no tub bathing, no sexual activities for 6 weeks unless otherwise instructed by your doctor.  Call your doctor with any signs and symptoms of infection such as fever, chills, nausea or vomiting.  Call your doctor with redness or swelling at the incision site, fluid leakage or wound separation.  Call your doctor if you're unable to tolerate food or have difficulty urinating.  Call your doctor if you have pain that is not relieved by your prescribed medications.  Notify your doctor with any other concerns.

## 2019-11-05 NOTE — DISCHARGE NOTE PROVIDER - PROVIDER TOKENS
FREE:[LAST:[[facility]],PHONE:[phone]],FAX:[phone]    -],ADDRESS:[[facility]  Oncology Building, 3rd floor    [facility]  [address]]]

## 2019-11-05 NOTE — PROGRESS NOTE ADULT - ASSESSMENT
Assessment/Plan: 49y female HD#2 admitted with abdominal pain, fevers, dysuria found to have bilateral TOAs, pelvic collections and R ovarian vein thrombosis. Patient currently on antibiotics (cefotetan, flagyl, doxy) and not a candidate for IR-guided drainage. Clinical condition improving. 49y F HD#2 admitted with abdominal pain, fevers, dysuria found to have bilateral TOAs, pelvic collections and R ovarian vein thrombosis. Patient currently on antibiotics (cefotetan, flagyl, doxy) and not a candidate for IR-guided drainage. Clinical condition improving.            Agree with PGY1 Note.  Pt appears more comfortable on exam today.  Afebrile/hemodynamically normal o/n.    - continue pain control prn  - continue doxy/flagyl/cefotetan for PID/TOAs  - continue lovenox for incidental ovarian vein thrombosis  - continue routine inpt care    FABIANA Valencia MD PGY3

## 2019-11-05 NOTE — DISCHARGE NOTE PROVIDER - NSDCCPTREATMENT_GEN_ALL_CORE_FT
PRINCIPAL PROCEDURE  Procedure: Exploratory laparotomy with lysis of adhesions  Findings and Treatment:       SECONDARY PROCEDURE  Procedure: Hysterectomy, total, abdominal, with bilateral salpingo-oophorectomy  Findings and Treatment:     Procedure: Bilateral ureterolysis  Findings and Treatment:     Procedure: Abdominal washout  Findings and Treatment:

## 2019-11-05 NOTE — CHART NOTE - NSCHARTNOTEFT_GEN_A_CORE
R2 GYN Chart Note    Patient seen at bedside with Dr. Muñiz and gyn team. Patient reports pain improved, no fevers or chills, nausea or vomiting. One additional episode of diarrhea today, small-volume.      T(C): 37.2 (11-05-19 @ 10:46), Max: 37.6 (11-05-19 @ 01:11)  HR: 96 (11-05-19 @ 10:46) (80 - 99)  BP: 103/53 (11-05-19 @ 10:46) (92/54 - 103/53)  RR: 16 (11-05-19 @ 10:46) (16 - 18)  SpO2: 98% (11-05-19 @ 10:46) (98% - 99%)      PE  General: NAD  Chest: RRR, CTABL  Abd: soft, appropriately tender, mildly distended. +bowel sounds  Incision: clean, dry, intact  Ext: bl symmetric, nontender. SCDs in place.                            10.4   18.87 )-----------( 277      ( 05 Nov 2019 08:10 )             32.8       11-03    135  |  98  |  9   ----------------------------<  144<H>  3.7   |  22  |  0.71    Ca    9.3      03 Nov 2019 23:58    TPro  7.7  /  Alb  3.7  /  TBili  < 0.2<L>  /  DBili  x   /  AST  33<H>  /  ALT  26  /  AlkPhos  110  11-03        Patient counseled at bedside that given increase in leukocytosis today, she may require surgery for the infection in her tubes and ovaries. Patient understands that the indicated surgery would be a Total Abdominal Hysterectomy Bilateral Salpingo-oophorectomy. Patient is clinically improving and non-septic, no indication for urgent surgical intervention at this time.    - plan for 5 days of IV antibiotics  - if patient clinically worsens or leukocytosis does not resolve, add on to OR  - if surgery is required, will consult appropriate services to ensure backup for anticipated difficult dissection given likely chronic inflammation (gyn onc v gen surg)  - given ovarian vein thrombosis and provoked state, will continue with prophylactic lovenox    Patient seen, evaluated, and discussed with Dr. Muñiz, Salvador PGY3 and Brittany PGY1  Violetta Diaz MD PGY2  Pager #[phone]    VWhite PGY-2

## 2019-11-06 ENCOUNTER — RESULT REVIEW (OUTPATIENT)
Age: 49
End: 2019-11-06

## 2019-11-06 LAB
APTT BLD: 31.3 SEC — SIGNIFICANT CHANGE UP (ref 27.5–36.3)
BASE EXCESS BLDV CALC-SCNC: 0 MMOL/L — SIGNIFICANT CHANGE UP
BASOPHILS # BLD AUTO: 0.03 K/UL — SIGNIFICANT CHANGE UP (ref 0–0.2)
BASOPHILS # BLD AUTO: 0.04 K/UL — SIGNIFICANT CHANGE UP (ref 0–0.2)
BASOPHILS NFR BLD AUTO: 0.1 % — SIGNIFICANT CHANGE UP (ref 0–2)
BASOPHILS NFR BLD AUTO: 0.2 % — SIGNIFICANT CHANGE UP (ref 0–2)
BLD GP AB SCN SERPL QL: NEGATIVE — SIGNIFICANT CHANGE UP
CA-I SERPL-SCNC: 1.21 MMOL/L — SIGNIFICANT CHANGE UP (ref 1.15–1.29)
EOSINOPHIL # BLD AUTO: 0 K/UL — SIGNIFICANT CHANGE UP (ref 0–0.5)
EOSINOPHIL # BLD AUTO: 0.02 K/UL — SIGNIFICANT CHANGE UP (ref 0–0.5)
EOSINOPHIL NFR BLD AUTO: 0 % — SIGNIFICANT CHANGE UP (ref 0–6)
EOSINOPHIL NFR BLD AUTO: 0.1 % — SIGNIFICANT CHANGE UP (ref 0–6)
FIBRINOGEN PPP-MCNC: 1254 MG/DL — HIGH (ref 350–510)
GAS PNL BLDV: 136 MMOL/L — SIGNIFICANT CHANGE UP (ref 136–146)
GLUCOSE BLDC GLUCOMTR-MCNC: 84 MG/DL — SIGNIFICANT CHANGE UP (ref 70–99)
GLUCOSE BLDV-MCNC: 118 MG/DL — HIGH (ref 70–99)
GRAM STN WND: SIGNIFICANT CHANGE UP
HCG SERPL-ACNC: < 5 MIU/ML — SIGNIFICANT CHANGE UP
HCO3 BLDV-SCNC: 24 MMOL/L — SIGNIFICANT CHANGE UP (ref 20–27)
HCT VFR BLD CALC: 29.7 % — LOW (ref 34.5–45)
HCT VFR BLD CALC: 33.3 % — LOW (ref 34.5–45)
HCT VFR BLDV CALC: 24 % — LOW (ref 34.5–45)
HGB BLD-MCNC: 10.7 G/DL — LOW (ref 11.5–15.5)
HGB BLD-MCNC: 9.5 G/DL — LOW (ref 11.5–15.5)
HGB BLDV-MCNC: 7.7 G/DL — LOW (ref 11.5–15.5)
IMM GRANULOCYTES NFR BLD AUTO: 0.9 % — SIGNIFICANT CHANGE UP (ref 0–1.5)
IMM GRANULOCYTES NFR BLD AUTO: 1.1 % — SIGNIFICANT CHANGE UP (ref 0–1.5)
INR BLD: 1.43 — HIGH (ref 0.88–1.17)
LYMPHOCYTES # BLD AUTO: 0.57 K/UL — LOW (ref 1–3.3)
LYMPHOCYTES # BLD AUTO: 0.85 K/UL — LOW (ref 1–3.3)
LYMPHOCYTES # BLD AUTO: 2.7 % — LOW (ref 13–44)
LYMPHOCYTES # BLD AUTO: 4.6 % — LOW (ref 13–44)
MCHC RBC-ENTMCNC: 25.7 PG — LOW (ref 27–34)
MCHC RBC-ENTMCNC: 26.2 PG — LOW (ref 27–34)
MCHC RBC-ENTMCNC: 32 % — SIGNIFICANT CHANGE UP (ref 32–36)
MCHC RBC-ENTMCNC: 32.1 % — SIGNIFICANT CHANGE UP (ref 32–36)
MCV RBC AUTO: 80 FL — SIGNIFICANT CHANGE UP (ref 80–100)
MCV RBC AUTO: 81.8 FL — SIGNIFICANT CHANGE UP (ref 80–100)
MONOCYTES # BLD AUTO: 0.5 K/UL — SIGNIFICANT CHANGE UP (ref 0–0.9)
MONOCYTES # BLD AUTO: 0.75 K/UL — SIGNIFICANT CHANGE UP (ref 0–0.9)
MONOCYTES NFR BLD AUTO: 2.4 % — SIGNIFICANT CHANGE UP (ref 2–14)
MONOCYTES NFR BLD AUTO: 4 % — SIGNIFICANT CHANGE UP (ref 2–14)
NEUTROPHILS # BLD AUTO: 16.73 K/UL — HIGH (ref 1.8–7.4)
NEUTROPHILS # BLD AUTO: 19.68 K/UL — HIGH (ref 1.8–7.4)
NEUTROPHILS NFR BLD AUTO: 90.2 % — HIGH (ref 43–77)
NEUTROPHILS NFR BLD AUTO: 93.7 % — HIGH (ref 43–77)
NRBC # FLD: 0 K/UL — SIGNIFICANT CHANGE UP (ref 0–0)
NRBC # FLD: 0 K/UL — SIGNIFICANT CHANGE UP (ref 0–0)
PCO2 BLDV: 46 MMHG — SIGNIFICANT CHANGE UP (ref 41–51)
PH BLDV: 7.35 PH — SIGNIFICANT CHANGE UP (ref 7.32–7.43)
PLATELET # BLD AUTO: 327 K/UL — SIGNIFICANT CHANGE UP (ref 150–400)
PLATELET # BLD AUTO: 328 K/UL — SIGNIFICANT CHANGE UP (ref 150–400)
PMV BLD: 10.5 FL — SIGNIFICANT CHANGE UP (ref 7–13)
PMV BLD: 10.8 FL — SIGNIFICANT CHANGE UP (ref 7–13)
PO2 BLDV: 29 MMHG — LOW (ref 35–40)
POTASSIUM BLDV-SCNC: 3.5 MMOL/L — SIGNIFICANT CHANGE UP (ref 3.4–4.5)
PROTHROM AB SERPL-ACNC: 16.5 SEC — HIGH (ref 9.8–13.1)
RBC # BLD: 3.63 M/UL — LOW (ref 3.8–5.2)
RBC # BLD: 4.16 M/UL — SIGNIFICANT CHANGE UP (ref 3.8–5.2)
RBC # FLD: 16.1 % — HIGH (ref 10.3–14.5)
RBC # FLD: 16.4 % — HIGH (ref 10.3–14.5)
RH IG SCN BLD-IMP: POSITIVE — SIGNIFICANT CHANGE UP
SAO2 % BLDV: 44 % — LOW (ref 60–85)
SPECIMEN SOURCE: SIGNIFICANT CHANGE UP
WBC # BLD: 18.56 K/UL — HIGH (ref 3.8–10.5)
WBC # BLD: 21.01 K/UL — HIGH (ref 3.8–10.5)
WBC # FLD AUTO: 18.56 K/UL — HIGH (ref 3.8–10.5)
WBC # FLD AUTO: 21.01 K/UL — HIGH (ref 3.8–10.5)

## 2019-11-06 PROCEDURE — 88307 TISSUE EXAM BY PATHOLOGIST: CPT | Mod: 26

## 2019-11-06 PROCEDURE — 88112 CYTOPATH CELL ENHANCE TECH: CPT | Mod: 26

## 2019-11-06 PROCEDURE — 86079 PHYS BLOOD BANK SERV AUTHRJ: CPT

## 2019-11-06 PROCEDURE — 50715 RELEASE OF URETER: CPT | Mod: 50

## 2019-11-06 PROCEDURE — 88305 TISSUE EXAM BY PATHOLOGIST: CPT | Mod: 26

## 2019-11-06 PROCEDURE — 58150 TOTAL HYSTERECTOMY: CPT | Mod: GC

## 2019-11-06 RX ORDER — SODIUM CHLORIDE 9 MG/ML
1000 INJECTION, SOLUTION INTRAVENOUS
Refills: 0 | Status: DISCONTINUED | OUTPATIENT
Start: 2019-11-06 | End: 2019-11-07

## 2019-11-06 RX ORDER — OXYCODONE HYDROCHLORIDE 5 MG/1
10 TABLET ORAL EVERY 6 HOURS
Refills: 0 | Status: DISCONTINUED | OUTPATIENT
Start: 2019-11-06 | End: 2019-11-06

## 2019-11-06 RX ORDER — METOCLOPRAMIDE HCL 10 MG
10 TABLET ORAL EVERY 8 HOURS
Refills: 0 | Status: DISCONTINUED | OUTPATIENT
Start: 2019-11-06 | End: 2019-11-15

## 2019-11-06 RX ORDER — HEPARIN SODIUM 5000 [USP'U]/ML
5000 INJECTION INTRAVENOUS; SUBCUTANEOUS EVERY 8 HOURS
Refills: 0 | Status: DISCONTINUED | OUTPATIENT
Start: 2019-11-06 | End: 2019-11-07

## 2019-11-06 RX ORDER — OXYCODONE HYDROCHLORIDE 5 MG/1
5 TABLET ORAL EVERY 4 HOURS
Refills: 0 | Status: DISCONTINUED | OUTPATIENT
Start: 2019-11-06 | End: 2019-11-07

## 2019-11-06 RX ORDER — ONDANSETRON 8 MG/1
8 TABLET, FILM COATED ORAL EVERY 8 HOURS
Refills: 0 | Status: DISCONTINUED | OUTPATIENT
Start: 2019-11-06 | End: 2019-11-15

## 2019-11-06 RX ORDER — IBUPROFEN 200 MG
600 TABLET ORAL EVERY 6 HOURS
Refills: 0 | Status: DISCONTINUED | OUTPATIENT
Start: 2019-11-06 | End: 2019-11-15

## 2019-11-06 RX ORDER — HYDROMORPHONE HYDROCHLORIDE 2 MG/ML
1 INJECTION INTRAMUSCULAR; INTRAVENOUS; SUBCUTANEOUS
Refills: 0 | Status: DISCONTINUED | OUTPATIENT
Start: 2019-11-06 | End: 2019-11-06

## 2019-11-06 RX ORDER — SIMETHICONE 80 MG/1
80 TABLET, CHEWABLE ORAL EVERY 8 HOURS
Refills: 0 | Status: DISCONTINUED | OUTPATIENT
Start: 2019-11-06 | End: 2019-11-15

## 2019-11-06 RX ORDER — ACETAMINOPHEN 500 MG
1000 TABLET ORAL ONCE
Refills: 0 | Status: COMPLETED | OUTPATIENT
Start: 2019-11-06 | End: 2019-11-06

## 2019-11-06 RX ORDER — HYDROMORPHONE HYDROCHLORIDE 2 MG/ML
0.5 INJECTION INTRAMUSCULAR; INTRAVENOUS; SUBCUTANEOUS
Refills: 0 | Status: DISCONTINUED | OUTPATIENT
Start: 2019-11-06 | End: 2019-11-06

## 2019-11-06 RX ORDER — SENNA PLUS 8.6 MG/1
2 TABLET ORAL AT BEDTIME
Refills: 0 | Status: DISCONTINUED | OUTPATIENT
Start: 2019-11-06 | End: 2019-11-15

## 2019-11-06 RX ADMIN — Medication 600 MILLIGRAM(S): at 23:41

## 2019-11-06 RX ADMIN — Medication 100 MILLIGRAM(S): at 17:10

## 2019-11-06 RX ADMIN — HEPARIN SODIUM 5000 UNIT(S): 5000 INJECTION INTRAVENOUS; SUBCUTANEOUS at 23:12

## 2019-11-06 RX ADMIN — Medication 100 MILLIGRAM(S): at 09:27

## 2019-11-06 RX ADMIN — SODIUM CHLORIDE 125 MILLILITER(S): 9 INJECTION, SOLUTION INTRAVENOUS at 22:00

## 2019-11-06 RX ADMIN — Medication 600 MILLIGRAM(S): at 18:15

## 2019-11-06 RX ADMIN — HYDROMORPHONE HYDROCHLORIDE 1 MILLIGRAM(S): 2 INJECTION INTRAMUSCULAR; INTRAVENOUS; SUBCUTANEOUS at 17:00

## 2019-11-06 RX ADMIN — Medication 100 GRAM(S): at 05:10

## 2019-11-06 RX ADMIN — OXYCODONE HYDROCHLORIDE 10 MILLIGRAM(S): 5 TABLET ORAL at 21:10

## 2019-11-06 RX ADMIN — Medication 400 MILLIGRAM(S): at 23:11

## 2019-11-06 RX ADMIN — Medication 100 MILLIGRAM(S): at 01:18

## 2019-11-06 RX ADMIN — HYDROMORPHONE HYDROCHLORIDE 1 MILLIGRAM(S): 2 INJECTION INTRAMUSCULAR; INTRAVENOUS; SUBCUTANEOUS at 16:45

## 2019-11-06 RX ADMIN — Medication 110 MILLIGRAM(S): at 12:02

## 2019-11-06 RX ADMIN — Medication 600 MILLIGRAM(S): at 23:11

## 2019-11-06 RX ADMIN — Medication 100 GRAM(S): at 17:05

## 2019-11-06 RX ADMIN — SODIUM CHLORIDE 125 MILLILITER(S): 9 INJECTION, SOLUTION INTRAVENOUS at 17:09

## 2019-11-06 RX ADMIN — OXYCODONE HYDROCHLORIDE 10 MILLIGRAM(S): 5 TABLET ORAL at 20:35

## 2019-11-06 RX ADMIN — SIMETHICONE 80 MILLIGRAM(S): 80 TABLET, CHEWABLE ORAL at 23:19

## 2019-11-06 RX ADMIN — Medication 100 MICROGRAM(S): at 05:10

## 2019-11-06 RX ADMIN — Medication 110 MILLIGRAM(S): at 23:11

## 2019-11-06 RX ADMIN — Medication 1000 MILLIGRAM(S): at 23:41

## 2019-11-06 RX ADMIN — Medication 10 MILLIGRAM(S): at 23:12

## 2019-11-06 NOTE — BRIEF OPERATIVE NOTE - NSEVIDNCEINFORABSCESSFT_GEN_ALL_CORE
bilateral pyosalpinx, diffuse inflammation of pelvis including posterior uterus, bilateral adnexae, culdesac, colon and sidewalls with pus and thick inflammatory residue, dense adhesions of adnexal structures to pelvic sidewalls and uterus

## 2019-11-06 NOTE — PROGRESS NOTE ADULT - SUBJECTIVE AND OBJECTIVE BOX
Gyn Progress Note HD#3    Subjective:   Pt seen and examined at bedside. Febrile to 38.1 at 9:45p with chills and sudden increased abdominal pain. Received tylenol with good response. Pain well controlled this morning but slightly increased from yesterday. Patient ambulating around bedroom. Passing flatus. Had one more episode of loose stools this morning. Tolerating regular diet but prefers liquids. Pt denies chest pain, SOB, nausea, vomiting, lightheadedness, dizziness.      Objective:  T(F): 99.4 (11-06-19 @ 05:07), Max: 100.6 (11-05-19 @ 21:47)  HR: 86 (11-06-19 @ 05:07) (83 - 104)  BP: 96/51 (11-06-19 @ 05:07) (96/51 - 103/64)  RR: 16 (11-06-19 @ 05:07) (16 - 16)  SpO2: 97% (11-06-19 @ 05:07) (97% - 100%)    I&O's Summary    04 Nov 2019 07:01  -  05 Nov 2019 07:00  --------------------------------------------------------  IN: 725 mL / OUT: 400 mL / NET: 325 mL    05 Nov 2019 07:01  -  06 Nov 2019 06:56  --------------------------------------------------------  IN: 550 mL / OUT: 1850 mL / NET: -1300 mL      MEDICATIONS  (STANDING):  cefoTEtan  IVPB 2 Gram(s) IV Intermittent every 12 hours  doxycycline IVPB 100 milliGRAM(s) IV Intermittent every 12 hours  enoxaparin Injectable 40 milliGRAM(s) SubCutaneous daily  levothyroxine 100 MICROGram(s) Oral daily  metroNIDAZOLE  IVPB 500 milliGRAM(s) IV Intermittent every 8 hours  simethicone 80 milliGRAM(s) Chew daily    MEDICATIONS  (PRN):  acetaminophen   Tablet .. 975 milliGRAM(s) Oral every 6 hours PRN Mild Pain (1 - 3)  ibuprofen  Tablet. 600 milliGRAM(s) Oral every 6 hours PRN Moderate Pain (4 - 6)      Physical Exam:  Constitutional: NAD, A+O x3  CV: RRR  Lungs: clear to auscultation bilaterally  Abdomen: soft, mildly distended, voluntary guarding, moderately tender to palpation throughout, no rebound, normal bowel sounds  Extremities: no lower extremity edema or calf tenderness bilaterally; venodynes in place    LABS:  AM CBC pending Gyn Progress Note HD#3    Subjective:   Pt seen and examined at bedside. Febrile to 38.1 at 9:45p with chills and sudden increased abdominal pain. Received tylenol with good response. Pain well controlled this morning but slightly increased from yesterday. Patient ambulating around bedroom. Passing flatus. Had one more episode of loose stools this morning. Tolerating regular diet but prefers liquids. Pt denies chest pain, SOB, nausea, vomiting, lightheadedness, dizziness. Patient's menses began yesterday with some passage of clots which is not unusual for her.     Objective:  T(F): 99.4 (11-06-19 @ 05:07), Max: 100.6 (11-05-19 @ 21:47)  HR: 86 (11-06-19 @ 05:07) (83 - 104)  BP: 96/51 (11-06-19 @ 05:07) (96/51 - 103/64)  RR: 16 (11-06-19 @ 05:07) (16 - 16)  SpO2: 97% (11-06-19 @ 05:07) (97% - 100%)    I&O's Summary    04 Nov 2019 07:01  -  05 Nov 2019 07:00  --------------------------------------------------------  IN: 725 mL / OUT: 400 mL / NET: 325 mL    05 Nov 2019 07:01  -  06 Nov 2019 06:56  --------------------------------------------------------  IN: 550 mL / OUT: 1850 mL / NET: -1300 mL      MEDICATIONS  (STANDING):  cefoTEtan  IVPB 2 Gram(s) IV Intermittent every 12 hours  doxycycline IVPB 100 milliGRAM(s) IV Intermittent every 12 hours  enoxaparin Injectable 40 milliGRAM(s) SubCutaneous daily  levothyroxine 100 MICROGram(s) Oral daily  metroNIDAZOLE  IVPB 500 milliGRAM(s) IV Intermittent every 8 hours  simethicone 80 milliGRAM(s) Chew daily    MEDICATIONS  (PRN):  acetaminophen   Tablet .. 975 milliGRAM(s) Oral every 6 hours PRN Mild Pain (1 - 3)  ibuprofen  Tablet. 600 milliGRAM(s) Oral every 6 hours PRN Moderate Pain (4 - 6)      Physical Exam:  Constitutional: NAD, A+O x3  CV: RRR  Lungs: clear to auscultation bilaterally  Abdomen: soft, mildly distended, voluntary guarding, moderately tender to palpation throughout, no rebound, normal bowel sounds  Extremities: no lower extremity edema or calf tenderness bilaterally; venodynes in place    LABS:  AM CBC pending

## 2019-11-06 NOTE — PROGRESS NOTE ADULT - PROBLEM SELECTOR PLAN 1
CV: Hemodynamically stable; continue q4h VS  Pulm: Saturating well on RA. Increase incentive spirometry.  GI: Tolerating regular diet; SLIV  : Voiding spontaneously; strict I/Os  ID: Bilateral salpingitis and TOAs. Pelvic collection likely phlegmon and not drainable at this time per IR. Continue cefotetan (11/4-), flagyl (11/4-), doxycycline (11/4-). f/u AM CBC. Will consider adjusting antibiotic regimen given fever overnight vs surgical approach to treatment at this time.  Heme: F/u AM CBC. Continue Lovenox for ovarian vein thrombosis and Venodynes for DVT ppx. Increase OOB.    Endo: Continue levothyroxine for pmh hypothyroid  Neuro: Continue oral meds for pain control    Nishi Kline, PGY-1.

## 2019-11-06 NOTE — BRIEF OPERATIVE NOTE - NSICDXBRIEFPREOP_GEN_ALL_CORE_FT
PRE-OP DIAGNOSIS:  TOA (tubo-ovarian abscess) 06-Nov-2019 16:13:57 bilateral, ruptured Nati Valencia

## 2019-11-06 NOTE — BRIEF OPERATIVE NOTE - NSICDXBRIEFPROCEDURE_GEN_ALL_CORE_FT
PROCEDURES:  Abdominal washout 06-Nov-2019 16:13:09  Nati Valencia  Exploratory laparotomy with lysis of adhesions 06-Nov-2019 16:13:02  Nati Valencia  Bilateral ureterolysis 06-Nov-2019 16:12:29  Nati Valencia  Hysterectomy, total, abdominal, with bilateral salpingo-oophorectomy 06-Nov-2019 16:12:17  Nait Valencia

## 2019-11-06 NOTE — CHART NOTE - NSCHARTNOTEFT_GEN_A_CORE
R1 OBGYN POST-OP CHECK    S: Patient seen and evaluated at bedside. Pt sleeping, easily arousable. Patient reports 8/10 pain. Pt denies N/V, SOB, CP, palpitations, fever/chills. Tolerating clears.  Not OOB yet.    O:   T(C): 36.6 (11-06-19 @ 16:30), Max: 36.6 (11-06-19 @ 16:30)  HR: 86 (11-06-19 @ 17:00) (86 - 89)  BP: 127/66 (11-06-19 @ 17:00) (127/66 - 131/76)  RR: 16 (11-06-19 @ 17:00) (11 - 16)  SpO2: 95% (11-06-19 @ 17:00) (95% - 98%)    I&O's Summary    05 Nov 2019 07:01  -  06 Nov 2019 07:00  --------------------------------------------------------  IN: 550 mL / OUT: 1850 mL / NET: -1300 mL    06 Nov 2019 07:01  -  06 Nov 2019 17:57  --------------------------------------------------------  IN: 250 mL / OUT: 125 mL / NET: 125 mL        Gen: Resting comfortably in bed, NAD  CV: S1S2, RRR  Lungs: CTA B/L  Abd: soft, appropriately tender, mildly distended, occasional BS x 4 quadrants.    Inc: midline vertical incision clean/dry/intact w/ bandage in place  Ext: SCD's in place and functional, non-tender b/l, no edema        A/P: 49y Female s/p LISA, BSO, b/l ureterolysis for rupture TOAs. Patient doing well in the recovery period.    Neuro: IV/PO Analgesia PRN  CV: Hemodynamically stable.  CBC at 8pm and in the morning.  Pulm: Saturating well on room air.  Encourage OOB and incentive spirometer use.   GI: Advance to regular diet as tolerated. Anti-emetics PRN.  : Webber to gravity. D/c in AM pending labs.  FEN: Electrolytes: LR@125cc/hr  Heme: SQH, DVT ppx w/ SCD's while in bed. Early ambulation, initially with assistance then as tolerated.  ID: Afebrile. Continue   Endo: ISS vs. No active issues   Dispo: Discharge from PACU when criteria met.     Nishi Kline, PGY-1 R1 OBGYN POST-OP CHECK    S: Patient seen and evaluated at bedside. Pt sleeping, easily arousable. Patient reports 8/10 pain. Pt denies N/V, SOB, CP, palpitations, fever/chills. Tolerating clears.  Not OOB yet.    O:   T(C): 36.6 (11-06-19 @ 16:30), Max: 36.6 (11-06-19 @ 16:30)  HR: 86 (11-06-19 @ 17:00) (86 - 89)  BP: 127/66 (11-06-19 @ 17:00) (127/66 - 131/76)  RR: 16 (11-06-19 @ 17:00) (11 - 16)  SpO2: 95% (11-06-19 @ 17:00) (95% - 98%)    I&O's Summary    05 Nov 2019 07:01  -  06 Nov 2019 07:00  --------------------------------------------------------  IN: 550 mL / OUT: 1850 mL / NET: -1300 mL    06 Nov 2019 07:01  -  06 Nov 2019 17:57  --------------------------------------------------------  IN: 250 mL / OUT: 125 mL / NET: 125 mL        Gen: Resting comfortably in bed, NAD  CV: S1S2, RRR  Lungs: CTA B/L  Abd: soft, appropriately tender, mildly distended, occasional BS x 4 quadrants.    Inc: midline vertical incision clean/dry/intact w/ bandage in place  Ext: SCD's in place and functional, non-tender b/l, no edema        A/P: 49y Female s/p LISA, BSO, b/l ureterolysis for rupture TOAs. Patient doing well in the recovery period.    Neuro: IV/PO Analgesia PRN  CV: Hemodynamically stable.  CBC at 8pm and in the morning.  Pulm: Saturating well on room air.  Encourage OOB and incentive spirometer use.   GI: Advance to regular diet as tolerated. Anti-emetics PRN.  : Webber to gravity. D/c in AM pending labs.  FEN: Electrolytes: LR@125cc/hr  Heme: SQH, DVT ppx w/ SCD's while in bed. Early ambulation, initially with assistance then as tolerated.  ID: Afebrile. Continue cefotetan, doxycyline, flagyl.  Endo: h/o hypothyroidism. Continue home levothyroxine.    Dispo: Discharge from PACU to floor when criteria met.     Nishi Kline, PGY-1

## 2019-11-06 NOTE — CONSULT NOTE ADULT - SUBJECTIVE AND OBJECTIVE BOX
R3 GYN ONC INPATIENT CONSULT NOTE    SUBJECTIVE:    48yo P3 w/ LMP 11/4/19 admitted w/ bilateral TOAs and R Ov vein thrombosis with intermittent fevers despite IV cefotetan/doxy/flagyl x48h.  Pt reports feeling generally better since admission when she had severe diffuse pelvic pain.  IR consult was placed on HD1 and no accessible drainable collection was seen.  She has mild cramps with her period.  She is otherwise eating/drinking, ambulating, voiding and passing flatus normally.  She reports last pap was normal in 2018; she denies abnormal pap and had them done every 1-2y prior to 2018 in [facility].  She reports normal q21-23d periods with moderate cramping and heavy flow due to fibroid uterus.  She is infrequently sexually active and mutually monogamous w/ 1 male partner ().      On admission speculum exam was notable for malodorous discharge.  GCCT swabs were collected and negative.  Pelvic exam had no CMT but was notable for significant R adnexal tenderness.  STI labs (HIV/HepB/HepC) have been negative.      OBH: NSVDx3  GYNH: see hpi  PMSH: hypothyroid, s/p BTL  MEDS: synthroid  ALL: nkda  SOCH: denies t/e/d; safe at home  FAMH: denies fam hx of breast/uterine/ovarian/colon cancer  ROS: negative except per hpi    OBJECTIVE:  Vital Signs Last 24 Hrs  T(C): 37.4 (06 Nov 2019 05:07), Max: 38.1 (05 Nov 2019 21:47)  T(F): 99.4 (06 Nov 2019 05:07), Max: 100.6 (05 Nov 2019 21:47)  HR: 86 (06 Nov 2019 05:07) (83 - 104)  BP: 96/51 (06 Nov 2019 05:07) (96/51 - 103/64)  BP(mean): --  RR: 16 (06 Nov 2019 05:07) (16 - 16)  SpO2: 97% (06 Nov 2019 05:07) (97% - 100%)    CAPILLARY BLOOD GLUCOSE    11-05-19 @ 07:01  -  11-06-19 @ 07:00  --------------------------------------------------------  IN: 550 mL / OUT: 1850 mL / NET: -1300 mL        PHYSICAL EXAM:  GEN: NAD, A&Ox3  CV: RRR, no m/g/r  LUNGS: CTAB  ABD: softly distended, mild-moderate tenderness to palpation of bilateral lower quadrants w/ no rebound  EXT: WWP, no edema/TTP    LABS:                        10.4   18.87 )-----------( 277      ( 05 Nov 2019 08:10 )             32.8   baso 0.2    eos 0.2    imm gran 0.7    lymph 5.0    mono 5.0    poly 88.9                         10.0   18.35 )-----------( 275      ( 05 Nov 2019 06:43 )             31.2   baso 0.3    eos 0.1    imm gran 0.7    lymph 7.6    mono 4.6    poly 86.7                         11.4   9.79  )-----------( 289      ( 03 Nov 2019 23:58 )             35.5   baso 0.4    eos 0.3    imm gran 0.7    lymph 13.7   mono 7.0    poly 77.9     RADIOLOGY:  < from: CT Abdomen and Pelvis w/ Oral Cont and w/ IV Cont (11.04.19 @ 03:37) >  EXAM:  CT ABDOMEN AND PELVIS OC IC        PROCEDURE DATE:  Nov 4 2019         INTERPRETATION:  CLINICAL INFORMATION: Diffuse abdominal pain    COMPARISON: None.    PROCEDURE:   CT of the Abdomen and Pelvis was performed with intravenous contrast.   Intravenous contrast: 90 ml Omnipaque 350. 10 ml discarded.  Oral contrast: positive contrast was administered.  Sagittal and coronal reformats were performed.    FINDINGS:    LOWER CHEST: Within normal limits    LIVER: Hypodense foci measuring 5 mmin segment 6 (2:53) and 4 mm in   segment 7 (2:17) are too small to characterize by CT scan.  BILE DUCTS: Normal caliber.  GALLBLADDER: Within normal limits.  SPLEEN: Within normal limits.  PANCREAS: Within normal limits.  ADRENALS: Within normal limits.  KIDNEYS/URETERS: Within normal limits.    BLADDER: Within normal limits.  REPRODUCTIVE ORGANS: There are dilated thick-walled and hyperemic   fallopian tubes with fluid collections bilaterally compatible with   salpingitis and tubo-ovarian abscesses.    A partly myometrial partly subserosal fibroid  A myometrial fibroid with small submucosal component in the anterior body   of uterus measures 2.5 cm (601:71).  A myometrial fibroid in the anterior right lower uterine segment measures   approximately 2 cm (601:67).    BOWEL: No bowel obstruction. Appendix is normal.  PERITONEUM: There is complex free fluid in the pelvis with subtle   peritoneal enhancement in the cul-de-sac, indicating peritonitis.  There   is mild abdominal ascites in theparacolic bladders bilaterally, around   the tip of the spleen and in the left subdiaphragmatic space.  VESSELS: Large filling defect in the proximal (inferior) aspect of the   right ovarian vein is compatible with acute thrombosis.  RETROPERITONEUM/LYMPH NODES: No lymphadenopathy.    ABDOMINAL WALL: Within normal limits.  BONES: Within normal limits.    IMPRESSION:     Bilateral salpingitis and tubo-ovarian abscesses with evidence of   peritonitis.  Acute thrombosis of the inferior aspect of theright   ovarian vein, for which anticoagulation may be warranted.    CRITICAL VALUE: Dr. Ortiz from radiology discussed the major findings   in this report with Dr. Means on 11/04/2019 at 4:30 AM.  Critical   value policy of the hospital was followed. Read back and confirmation of   receipt of this communication was performed. This verbal communication   supplements the text report of this document.              PHILLIP ORTIZ M.D., RADIOLOGY RESIDENT  This document has been electronically signed.  ABRAM QUINONES M.D.,ATTENDING RADIOLOGIST  This document has been electronically signed. Nov 4 2019  4:31AM    < end of copied text > R3 GYN ONC INPATIENT CONSULT NOTE    SUBJECTIVE:    50yo P3 w/ LMP 11/4/19 admitted w/ bilateral TOAs and R Ov vein thrombosis with intermittent fevers, on IV cefotetan/doxy/flagyl x48h (since Sun 11/3/19 pm).  Pt reports feeling generally better since admission when she had severe diffuse pelvic pain.  States she feels less "swollen" in her belly.  IR consult was placed on HD1 and no accessible drainable collection was seen.  She has mild cramps with her period (currently menstruating.)  She is otherwise eating/drinking, ambulating, voiding and passing flatus normally.  She reports last pap was normal in 2018; she denies abnormal pap and had them done every 1-2y prior to 2018 in [facility].  She reports normal q21-23d periods with moderate cramping and heavy flow due to fibroid uterus.  She is infrequently sexually active and mutually monogamous w/ 1 male partner ().      Per review, on admission speculum exam was notable for malodorous discharge.  GCCT swabs were collected and negative.  Pelvic exam had no CMT but was notable for significant R adnexal tenderness.  STI labs (HIV/HepB/HepC) have been negative.  WBC on admission 9 (11/3) but rui to 18 (11/4, 11/5). Tlast was yesterday 11/5/19 of 100.4F at nine pm.  Am labs pending.     OBH: NSVDx3  GYNH: see hpi  PMSH: hypothyroid, s/p BTL (pp TL, thru umbilicus)  MEDS: synthroid  ALL: nkda  SOCH: denies t/e/d; safe at home  FAMH: denies fam hx of breast/uterine/ovarian/colon cancer  ROS: negative except per hpi    OBJECTIVE:  Vital Signs Last 24 Hrs  T(C): 37.4 (06 Nov 2019 05:07), Max: 38.1 (05 Nov 2019 21:47)  T(F): 99.4 (06 Nov 2019 05:07), Max: 100.6 (05 Nov 2019 21:47)  HR: 86 (06 Nov 2019 05:07) (83 - 104)  BP: 96/51 (06 Nov 2019 05:07) (96/51 - 103/64)  BP(mean): --  RR: 16 (06 Nov 2019 05:07) (16 - 16)  SpO2: 97% (06 Nov 2019 05:07) (97% - 100%)    CAPILLARY BLOOD GLUCOSE    11-05-19 @ 07:01  -  11-06-19 @ 07:00  --------------------------------------------------------  IN: 550 mL / OUT: 1850 mL / NET: -1300 mL        PHYSICAL EXAM:  GEN: NAD, A&Ox3  CV: RRR, no m/g/r  LUNGS: CTAB  ABD: nondistended, mild-moderate tenderness to palpation of bilateral lower quadrants w/ no rebound, no guarding  EXT: WWP, no edema/TTP    LABS:                        10.4   18.87 )-----------( 277      ( 05 Nov 2019 08:10 )             32.8   baso 0.2    eos 0.2    imm gran 0.7    lymph 5.0    mono 5.0    poly 88.9                         10.0   18.35 )-----------( 275      ( 05 Nov 2019 06:43 )             31.2   baso 0.3    eos 0.1    imm gran 0.7    lymph 7.6    mono 4.6    poly 86.7                         11.4   9.79  )-----------( 289      ( 03 Nov 2019 23:58 )             35.5   baso 0.4    eos 0.3    imm gran 0.7    lymph 13.7   mono 7.0    poly 77.9     RADIOLOGY:  < from: CT Abdomen and Pelvis w/ Oral Cont and w/ IV Cont (11.04.19 @ 03:37) >  EXAM:  CT ABDOMEN AND PELVIS OC IC        PROCEDURE DATE:  Nov 4 2019         INTERPRETATION:  CLINICAL INFORMATION: Diffuse abdominal pain    COMPARISON: None.    PROCEDURE:   CT of the Abdomen and Pelvis was performed with intravenous contrast.   Intravenous contrast: 90 ml Omnipaque 350. 10 ml discarded.  Oral contrast: positive contrast was administered.  Sagittal and coronal reformats were performed.    FINDINGS:    LOWER CHEST: Within normal limits    LIVER: Hypodense foci measuring 5 mmin segment 6 (2:53) and 4 mm in   segment 7 (2:17) are too small to characterize by CT scan.  BILE DUCTS: Normal caliber.  GALLBLADDER: Within normal limits.  SPLEEN: Within normal limits.  PANCREAS: Within normal limits.  ADRENALS: Within normal limits.  KIDNEYS/URETERS: Within normal limits.    BLADDER: Within normal limits.  REPRODUCTIVE ORGANS: There are dilated thick-walled and hyperemic   fallopian tubes with fluid collections bilaterally compatible with   salpingitis and tubo-ovarian abscesses.    A partly myometrial partly subserosal fibroid  A myometrial fibroid with small submucosal component in the anterior body   of uterus measures 2.5 cm (601:71).  A myometrial fibroid in the anterior right lower uterine segment measures   approximately 2 cm (601:67).    BOWEL: No bowel obstruction. Appendix is normal.  PERITONEUM: There is complex free fluid in the pelvis with subtle   peritoneal enhancement in the cul-de-sac, indicating peritonitis.  There   is mild abdominal ascites in theparacolic bladders bilaterally, around   the tip of the spleen and in the left subdiaphragmatic space.  VESSELS: Large filling defect in the proximal (inferior) aspect of the   right ovarian vein is compatible with acute thrombosis.  RETROPERITONEUM/LYMPH NODES: No lymphadenopathy.    ABDOMINAL WALL: Within normal limits.  BONES: Within normal limits.    IMPRESSION:     Bilateral salpingitis and tubo-ovarian abscesses with evidence of   peritonitis.  Acute thrombosis of the inferior aspect of theright   ovarian vein, for which anticoagulation may be warranted.    CRITICAL VALUE: Dr. Ortiz from radiology discussed the major findings   in this report with Dr. Means on 11/04/2019 at 4:30 AM.  Critical   value policy of the hospital was followed. Read back and confirmation of   receipt of this communication was performed. This verbal communication   supplements the text report of this document.              PHILLIP ORTIZ M.D., RADIOLOGY RESIDENT  This document has been electronically signed.  ABRAM QUINONES M.D.,ATTENDING RADIOLOGIST  This document has been electronically signed. Nov 4 2019  4:31AM    < end of copied text >

## 2019-11-06 NOTE — CONSULT NOTE ADULT - ASSESSMENT
50yo P3 w/ LMP 11/4/19 admitted w/ bilateral TOAs and R Ov vein thrombosis with intermittent fevers despite IV cefotetan/doxy/flagyl x48h.  GYN ONC consulted for back up for possible add on ex lap.    RECOMMENDATIONS:   - continue IV abx   - f/u AM CBC/diff   - will discuss with Dr. Bean    d/w Dr. Melisa Valencia MD PGY3 48yo P3 w/ LMP 11/4/19 admitted w/ bilateral TOAs and R Ov vein thrombosis, on cefotetan/doxy/flagyl x48h.  GYN ONC consulted for back up for possible add on ex lap today. Nontoxic appearing,  presenting symptoms (abd/pelvic pain) improving, am labs still pending.     RECOMMENDATIONS:   - continue IV abx   - f/u AM CBC/diff   - will discuss with Dr. Bean    d/w Dr. Melisa Valencia MD PGY3

## 2019-11-06 NOTE — PROGRESS NOTE ADULT - ASSESSMENT
Assessment/Plan: 49y female HD#3 admitted with abdominal pain, fevers, dysuria found to have bilateral TOAs, pelvic collections and incidental R ovarian vein thrombosis. Patient currently on antibiotics (cefotetan, flagyl, doxy) and not a candidate for IR-guided drainage. Patient demonstrates worsening clinic status as she was febrile overnight and has increased abdominal pain on exam today.

## 2019-11-06 NOTE — BRIEF OPERATIVE NOTE - NSICDXBRIEFPOSTOP_GEN_ALL_CORE_FT
POST-OP DIAGNOSIS:  TOA (tubo-ovarian abscess) 06-Nov-2019 16:14:21 bilateral, ruptured Nati Valencia

## 2019-11-06 NOTE — BRIEF OPERATIVE NOTE - OPERATION/FINDINGS
EUA: 9wk sized axial uterus with decreased mobility, fullness of right adnexa, left adnexa nonpalpable  EXLAP: bilateral pyosalpinx, diffuse inflammation of pelvis including posterior uterus, bilateral adnexae, culdesac, colon and sidewalls with pus and thick inflammatory residue, dense adhesions of adnexal structures to pelvic sidewalls and uterus

## 2019-11-06 NOTE — CHART NOTE - NSCHARTNOTEFT_GEN_A_CORE
R2 GYN POST-OP CHECK    S: Patient seen and evaluated at bedside.  Pt awake and alert resting comfortably in bed. Her friend is at bedside with her translating.   Patient reports pain 5/10. Pt denies N/V, SOB, CP, palpitations, fever/chills. Tolerating clears.  Not OOB yet.    O:   T(C): 36.8 (11-07-19 @ 01:07), Max: 37.1 (11-06-19 @ 23:00)  HR: 81 (11-07-19 @ 01:07) (72 - 81)  BP: 113/5 (11-07-19 @ 01:07) (113/5 - 129/72)  RR: 17 (11-07-19 @ 01:07) (17 - 17)  SpO2: 98% (11-07-19 @ 01:07) (98% - 98%)  Wt(kg): --  I&O's Summary    05 Nov 2019 07:01  -  06 Nov 2019 07:00  --------------------------------------------------------  IN: 550 mL / OUT: 1850 mL / NET: -1300 mL    06 Nov 2019 07:01  -  07 Nov 2019 01:09  --------------------------------------------------------  IN: 1600 mL / OUT: 1095 mL / NET: 505 mL    Gen: Resting comfortably in bed, NAD  CV: S1S2, RRR  Lungs: CTA B/L  Abd: soft, appropriately tender, BS x 4 quadrants.    Inc: Midline vertical incision Clean/dry/intact w/ bandage in place  Ext: SCD's in place and functional, non-tender b/l, no edema      A/P: 49y Female s/p LISA, BSO, removal of diseased tissue and bl ureterolysis for bl salpingitis and TOAs. Patient is doing well and meeting appropriate post-op milestones.    1. Neuro: Analgesia PRN. Patient is due for IV tylenol at this time.   ibuprofen  Tablet. 600 milliGRAM(s) Oral every 6 hours  ondansetron    Tablet 8 milliGRAM(s) Oral every 8 hours  oxyCODONE    IR 5 milliGRAM(s) Oral every 4 hours PRN  oxyCODONE    IR 10 milliGRAM(s) Oral every 6 hours PRN     2. CV: Hemodynamically stable. CBC in AM. CBC at 8pm with stable H/H.  3. Pulm: Saturating well on room air.  Encourage OOB and incentive spirometer use.   4. GI: Advance to regular diet. Anti-emetics PRN.  5. : Webber to gravity. Consider TOV in AM  6. Electrolytes: LR@125cc/hr  7. DVT ppx w/ SCD's while in bed. Early ambulation, initially with assistance then as tolerated.  Continue HSQ 5000U BID.  Dispo: c/w inpatient care    Gita Vasquez PGY-2

## 2019-11-07 LAB
ANISOCYTOSIS BLD QL: SLIGHT — SIGNIFICANT CHANGE UP
BASOPHILS # BLD AUTO: 0.03 K/UL — SIGNIFICANT CHANGE UP (ref 0–0.2)
BASOPHILS NFR BLD AUTO: 0.2 % — SIGNIFICANT CHANGE UP (ref 0–2)
BASOPHILS NFR SPEC: 0.5 % — SIGNIFICANT CHANGE UP (ref 0–2)
EOSINOPHIL # BLD AUTO: 0 K/UL — SIGNIFICANT CHANGE UP (ref 0–0.5)
EOSINOPHIL NFR BLD AUTO: 0 % — SIGNIFICANT CHANGE UP (ref 0–6)
EOSINOPHIL NFR FLD: 0 % — SIGNIFICANT CHANGE UP (ref 0–6)
HCT VFR BLD CALC: 38 % — SIGNIFICANT CHANGE UP (ref 34.5–45)
HGB BLD-MCNC: 12.4 G/DL — SIGNIFICANT CHANGE UP (ref 11.5–15.5)
IMM GRANULOCYTES NFR BLD AUTO: 0.6 % — SIGNIFICANT CHANGE UP (ref 0–1.5)
LYMPHOCYTES # BLD AUTO: 0.99 K/UL — LOW (ref 1–3.3)
LYMPHOCYTES # BLD AUTO: 5.8 % — LOW (ref 13–44)
LYMPHOCYTES NFR SPEC AUTO: 6.5 % — LOW (ref 13–44)
MANUAL SMEAR VERIFICATION: SIGNIFICANT CHANGE UP
MCHC RBC-ENTMCNC: 26.7 PG — LOW (ref 27–34)
MCHC RBC-ENTMCNC: 32.6 % — SIGNIFICANT CHANGE UP (ref 32–36)
MCV RBC AUTO: 81.7 FL — SIGNIFICANT CHANGE UP (ref 80–100)
MICROCYTES BLD QL: SLIGHT — SIGNIFICANT CHANGE UP
MONOCYTES # BLD AUTO: 0.6 K/UL — SIGNIFICANT CHANGE UP (ref 0–0.9)
MONOCYTES NFR BLD AUTO: 3.5 % — SIGNIFICANT CHANGE UP (ref 2–14)
MONOCYTES NFR BLD: 7.5 % — SIGNIFICANT CHANGE UP (ref 2–9)
NEUTROPHIL AB SER-ACNC: 85 % — HIGH (ref 43–77)
NEUTROPHILS # BLD AUTO: 15.27 K/UL — HIGH (ref 1.8–7.4)
NEUTROPHILS NFR BLD AUTO: 89.9 % — HIGH (ref 43–77)
NEUTS BAND # BLD: 0.5 % — SIGNIFICANT CHANGE UP (ref 0–6)
NON-GYNECOLOGICAL CYTOLOGY STUDY: SIGNIFICANT CHANGE UP
NRBC # BLD: 0 /100WBC — SIGNIFICANT CHANGE UP
NRBC # FLD: 0 K/UL — SIGNIFICANT CHANGE UP (ref 0–0)
PLATELET # BLD AUTO: 265 K/UL — SIGNIFICANT CHANGE UP (ref 150–400)
PMV BLD: 12.1 FL — SIGNIFICANT CHANGE UP (ref 7–13)
POLYCHROMASIA BLD QL SMEAR: SLIGHT — SIGNIFICANT CHANGE UP
RBC # BLD: 4.65 M/UL — SIGNIFICANT CHANGE UP (ref 3.8–5.2)
RBC # FLD: 16.5 % — HIGH (ref 10.3–14.5)
WBC # BLD: 17 K/UL — HIGH (ref 3.8–10.5)
WBC # FLD AUTO: 17 K/UL — HIGH (ref 3.8–10.5)

## 2019-11-07 RX ORDER — FAMOTIDINE 10 MG/ML
20 INJECTION INTRAVENOUS DAILY
Refills: 0 | Status: DISCONTINUED | OUTPATIENT
Start: 2019-11-07 | End: 2019-11-15

## 2019-11-07 RX ORDER — ENOXAPARIN SODIUM 100 MG/ML
40 INJECTION SUBCUTANEOUS DAILY
Refills: 0 | Status: DISCONTINUED | OUTPATIENT
Start: 2019-11-07 | End: 2019-11-15

## 2019-11-07 RX ORDER — ACETAMINOPHEN 500 MG
975 TABLET ORAL EVERY 6 HOURS
Refills: 0 | Status: DISCONTINUED | OUTPATIENT
Start: 2019-11-07 | End: 2019-11-15

## 2019-11-07 RX ADMIN — Medication 110 MILLIGRAM(S): at 21:46

## 2019-11-07 RX ADMIN — Medication 600 MILLIGRAM(S): at 23:00

## 2019-11-07 RX ADMIN — FAMOTIDINE 20 MILLIGRAM(S): 10 INJECTION INTRAVENOUS at 12:02

## 2019-11-07 RX ADMIN — Medication 975 MILLIGRAM(S): at 12:31

## 2019-11-07 RX ADMIN — Medication 10 MILLIGRAM(S): at 17:23

## 2019-11-07 RX ADMIN — Medication 100 MILLIGRAM(S): at 17:22

## 2019-11-07 RX ADMIN — SENNA PLUS 2 TABLET(S): 8.6 TABLET ORAL at 08:45

## 2019-11-07 RX ADMIN — Medication 100 MICROGRAM(S): at 05:51

## 2019-11-07 RX ADMIN — SODIUM CHLORIDE 125 MILLILITER(S): 9 INJECTION, SOLUTION INTRAVENOUS at 08:45

## 2019-11-07 RX ADMIN — HEPARIN SODIUM 5000 UNIT(S): 5000 INJECTION INTRAVENOUS; SUBCUTANEOUS at 05:50

## 2019-11-07 RX ADMIN — Medication 100 GRAM(S): at 05:51

## 2019-11-07 RX ADMIN — Medication 600 MILLIGRAM(S): at 14:34

## 2019-11-07 RX ADMIN — Medication 975 MILLIGRAM(S): at 17:52

## 2019-11-07 RX ADMIN — Medication 600 MILLIGRAM(S): at 21:47

## 2019-11-07 RX ADMIN — Medication 100 MILLIGRAM(S): at 00:16

## 2019-11-07 RX ADMIN — Medication 600 MILLIGRAM(S): at 06:20

## 2019-11-07 RX ADMIN — ONDANSETRON 8 MILLIGRAM(S): 8 TABLET, FILM COATED ORAL at 21:48

## 2019-11-07 RX ADMIN — ENOXAPARIN SODIUM 40 MILLIGRAM(S): 100 INJECTION SUBCUTANEOUS at 12:02

## 2019-11-07 RX ADMIN — Medication 110 MILLIGRAM(S): at 09:59

## 2019-11-07 RX ADMIN — Medication 975 MILLIGRAM(S): at 12:01

## 2019-11-07 RX ADMIN — ONDANSETRON 8 MILLIGRAM(S): 8 TABLET, FILM COATED ORAL at 14:34

## 2019-11-07 RX ADMIN — SIMETHICONE 80 MILLIGRAM(S): 80 TABLET, CHEWABLE ORAL at 08:44

## 2019-11-07 RX ADMIN — OXYCODONE HYDROCHLORIDE 5 MILLIGRAM(S): 5 TABLET ORAL at 08:45

## 2019-11-07 RX ADMIN — Medication 100 GRAM(S): at 17:21

## 2019-11-07 RX ADMIN — Medication 10 MILLIGRAM(S): at 05:51

## 2019-11-07 RX ADMIN — Medication 975 MILLIGRAM(S): at 17:22

## 2019-11-07 RX ADMIN — Medication 600 MILLIGRAM(S): at 05:50

## 2019-11-07 RX ADMIN — SIMETHICONE 80 MILLIGRAM(S): 80 TABLET, CHEWABLE ORAL at 19:53

## 2019-11-07 RX ADMIN — Medication 600 MILLIGRAM(S): at 15:04

## 2019-11-07 RX ADMIN — OXYCODONE HYDROCHLORIDE 5 MILLIGRAM(S): 5 TABLET ORAL at 09:15

## 2019-11-07 RX ADMIN — Medication 100 MILLIGRAM(S): at 08:45

## 2019-11-07 NOTE — PROGRESS NOTE ADULT - ASSESSMENT
Assessment/Plan: 49y female HD#4 admitted with abd pain, fevers and dysuria, found to have bilateral TOAs, pelvic collections and incidental R ovarian vein thrombosis, initially placed on IV antibiotics. Patient demonstrated worsening clinical status on HD#3 and is now POD#1 s/p LISA, BSO and b/l ureterolysis. Patient is doing well this morning, afebrile and her pain is well-controlled.

## 2019-11-07 NOTE — PROGRESS NOTE ADULT - PROBLEM SELECTOR PLAN 1
CV: Hemodynamically stable  Pulm: Saturating well on RA. Increase incentive spirometry.  GI: Advance diet as tolerated  : Webber in place. Adequate UOP. Will d/c this morning pending AM labs.  Heme: Continue HSQ/Venodynes for DVT ppx and ovarian vein thrombosis. Increase OOB.    ID: Currently afebrile. Plan to continue cefotetan (11/4-), doxycyline (11/4-), flagyl (11/4-). Will follow-up intraop fluid culture and pathology.  Endo: Continue levithyroxine for h/o hypothyroidism.  Neuro: Continue oral meds and IV Tylenol for pain control.  FEN: LR@125, AM CBC pending  Dispo: patient plans on returning to [facility] on 11/13. Will need to get contact information in order to update patient with pathology results once she leaves the country    Nishi Kline, PGY-1

## 2019-11-07 NOTE — PROGRESS NOTE ADULT - SUBJECTIVE AND OBJECTIVE BOX
Gyn Progress Note POD#1 HD#4    Subjective:   Pt seen and examined at bedside. No events overnight. Pain well controlled. Patient ambulating. Denies passing flatus. Tolerating clear liquid diet. Pt denies fever, chills, chest pain, SOB, nausea, vomiting, lightheadedness, dizziness.      Objective:  T(F): 98.4 (11-07-19 @ 05:47), Max: 98.8 (11-06-19 @ 23:00)  HR: 70 (11-07-19 @ 05:47) (70 - 102)  BP: 113/56 (11-07-19 @ 05:47) (108/55 - 138/67)  RR: 16 (11-07-19 @ 05:47) (11 - 19)  SpO2: 98% (11-07-19 @ 05:47) (95% - 100%)    I&O's Summary    05 Nov 2019 07:01  -  06 Nov 2019 07:00  --------------------------------------------------------  IN: 550 mL / OUT: 1850 mL / NET: -1300 mL    06 Nov 2019 07:01  -  07 Nov 2019 06:22  --------------------------------------------------------  IN: 2600 mL / OUT: 1570 mL / NET: 1030 mL      CAPILLARY BLOOD GLUCOSE  POCT Blood Glucose.: 84 mg/dL (06 Nov 2019 11:08)      MEDICATIONS  (STANDING):  cefoTEtan  IVPB 2 Gram(s) IV Intermittent every 12 hours  doxycycline IVPB 100 milliGRAM(s) IV Intermittent every 12 hours  heparin  Injectable 5000 Unit(s) SubCutaneous every 8 hours  ibuprofen  Tablet. 600 milliGRAM(s) Oral every 6 hours  lactated ringers. 1000 milliLiter(s) (125 mL/Hr) IV Continuous <Continuous>  lactated ringers. 1000 milliLiter(s) (125 mL/Hr) IV Continuous <Continuous>  levothyroxine 100 MICROGram(s) Oral daily  metoclopramide 10 milliGRAM(s) Oral every 8 hours  metroNIDAZOLE  IVPB 500 milliGRAM(s) IV Intermittent every 8 hours  ondansetron    Tablet 8 milliGRAM(s) Oral every 8 hours    MEDICATIONS  (PRN):  oxyCODONE    IR 5 milliGRAM(s) Oral every 4 hours PRN Moderate Pain (4 - 6)  oxyCODONE    IR 10 milliGRAM(s) Oral every 6 hours PRN Severe Pain (7 - 10)  senna 2 Tablet(s) Oral at bedtime PRN Constipation  simethicone 80 milliGRAM(s) Chew every 8 hours PRN Gas      Physical Exam:  Constitutional: NAD, A+O x3  CV: RRR  Lungs: clear to auscultation bilaterally  Abdomen: soft, mildly distended and tender to palpation throughout, no guarding, no rebound, hypoactive bowel sounds  Incision: midline laparotomy clean, dry, intact  Extremities: no lower extremity edema or calf tenderness bilaterally; venodynes in place    LABS:                        9.5    21.01 )-----------( 328      ( 06 Nov 2019 21:30 )             29.7       PT/INR - ( 06 Nov 2019 08:22 )   PT: 16.5 SEC;   INR: 1.43       PTT - ( 06 Nov 2019 08:22 )  PTT:31.3 SEC      Assessment/Plan: 49y female HD#4 admitted with abd pain, fevers and dysuria, found to have bilateral TOAs, pelvic collections and incidental R ovarian vein thrombosis, initially placed on IV antibiotics. patient demonstrated worsening clinical statys s on HD#3, now POD#1 s/p LISA, BSO and b/l ureterolysis. Patient is doing well this morning, afebrile and her pain is well-controlled. **    CV: Hemodynamically stable  Pulm: Saturating well on RA. Increase incentive spirometry.  GI: Advance diet as tolerated  : Webber in place. Adequate UOP  Heme: Continue HSQ/Lovenox/Venodynes for DVT ppx. Increase OOB.    Neuro: PCA for pain control. // Continue oral meds for pain control. Gyn Progress Note POD#1 HD#4    Subjective:   Pt seen and examined at bedside. No events overnight. Pain well controlled. Patient ambulating. Denies passing flatus. Tolerating clear liquid diet. Pt denies fever, chills, chest pain, SOB, nausea, vomiting, lightheadedness, dizziness.      Objective:  T(F): 98.4 (11-07-19 @ 05:47), Max: 98.8 (11-06-19 @ 23:00)  HR: 70 (11-07-19 @ 05:47) (70 - 102)  BP: 113/56 (11-07-19 @ 05:47) (108/55 - 138/67)  RR: 16 (11-07-19 @ 05:47) (11 - 19)  SpO2: 98% (11-07-19 @ 05:47) (95% - 100%)    I&O's Summary    05 Nov 2019 07:01  -  06 Nov 2019 07:00  --------------------------------------------------------  IN: 550 mL / OUT: 1850 mL / NET: -1300 mL    06 Nov 2019 07:01  -  07 Nov 2019 06:22  --------------------------------------------------------  IN: 2600 mL / OUT: 1570 mL / NET: 1030 mL      CAPILLARY BLOOD GLUCOSE  POCT Blood Glucose.: 84 mg/dL (06 Nov 2019 11:08)      MEDICATIONS  (STANDING):  cefoTEtan  IVPB 2 Gram(s) IV Intermittent every 12 hours  doxycycline IVPB 100 milliGRAM(s) IV Intermittent every 12 hours  heparin  Injectable 5000 Unit(s) SubCutaneous every 8 hours  ibuprofen  Tablet. 600 milliGRAM(s) Oral every 6 hours  lactated ringers. 1000 milliLiter(s) (125 mL/Hr) IV Continuous <Continuous>  lactated ringers. 1000 milliLiter(s) (125 mL/Hr) IV Continuous <Continuous>  levothyroxine 100 MICROGram(s) Oral daily  metoclopramide 10 milliGRAM(s) Oral every 8 hours  metroNIDAZOLE  IVPB 500 milliGRAM(s) IV Intermittent every 8 hours  ondansetron    Tablet 8 milliGRAM(s) Oral every 8 hours    MEDICATIONS  (PRN):  oxyCODONE    IR 5 milliGRAM(s) Oral every 4 hours PRN Moderate Pain (4 - 6)  oxyCODONE    IR 10 milliGRAM(s) Oral every 6 hours PRN Severe Pain (7 - 10)  senna 2 Tablet(s) Oral at bedtime PRN Constipation  simethicone 80 milliGRAM(s) Chew every 8 hours PRN Gas      Physical Exam:  Constitutional: NAD, A+O x3  CV: RRR  Lungs: clear to auscultation bilaterally  Abdomen: soft, mildly distended and tender to palpation throughout, no guarding, no rebound, hypoactive bowel sounds  Incision: midline laparotomy clean, dry, intact  Extremities: no lower extremity edema or calf tenderness bilaterally; venodynes in place    LABS:                        9.5    21.01 )-----------( 328      ( 06 Nov 2019 21:30 )             29.7       PT/INR - ( 06 Nov 2019 08:22 )   PT: 16.5 SEC;   INR: 1.43       PTT - ( 06 Nov 2019 08:22 )  PTT:31.3 SEC

## 2019-11-08 LAB
-  AMIKACIN: SIGNIFICANT CHANGE UP
-  AMPICILLIN/SULBACTAM: SIGNIFICANT CHANGE UP
-  AMPICILLIN: SIGNIFICANT CHANGE UP
-  AZTREONAM: SIGNIFICANT CHANGE UP
-  CEFAZOLIN: SIGNIFICANT CHANGE UP
-  CEFEPIME: SIGNIFICANT CHANGE UP
-  CEFOXITIN: SIGNIFICANT CHANGE UP
-  CEFTAZIDIME: SIGNIFICANT CHANGE UP
-  CEFTRIAXONE: SIGNIFICANT CHANGE UP
-  ERTAPENEM: SIGNIFICANT CHANGE UP
-  GENTAMICIN: SIGNIFICANT CHANGE UP
-  IMIPENEM: SIGNIFICANT CHANGE UP
-  LEVOFLOXACIN: SIGNIFICANT CHANGE UP
-  MEROPENEM: SIGNIFICANT CHANGE UP
-  PIPERACILLIN/TAZOBACTAM: SIGNIFICANT CHANGE UP
-  TIGECYCLINE: SIGNIFICANT CHANGE UP
-  TOBRAMYCIN: SIGNIFICANT CHANGE UP
-  TRIMETHOPRIM/SULFAMETHOXAZOLE: SIGNIFICANT CHANGE UP
BASOPHILS # BLD AUTO: 0.05 K/UL — SIGNIFICANT CHANGE UP (ref 0–0.2)
BASOPHILS NFR BLD AUTO: 0.4 % — SIGNIFICANT CHANGE UP (ref 0–2)
CULTURE - SURGICAL SITE: SIGNIFICANT CHANGE UP
EOSINOPHIL # BLD AUTO: 0.13 K/UL — SIGNIFICANT CHANGE UP (ref 0–0.5)
EOSINOPHIL NFR BLD AUTO: 0.9 % — SIGNIFICANT CHANGE UP (ref 0–6)
GRAM STN WND: SIGNIFICANT CHANGE UP
HCT VFR BLD CALC: 28.1 % — LOW (ref 34.5–45)
HGB BLD-MCNC: 9.4 G/DL — LOW (ref 11.5–15.5)
IMM GRANULOCYTES NFR BLD AUTO: 0.6 % — SIGNIFICANT CHANGE UP (ref 0–1.5)
LYMPHOCYTES # BLD AUTO: 1.21 K/UL — SIGNIFICANT CHANGE UP (ref 1–3.3)
LYMPHOCYTES # BLD AUTO: 8.7 % — LOW (ref 13–44)
MANUAL SMEAR VERIFICATION: SIGNIFICANT CHANGE UP
MCHC RBC-ENTMCNC: 26.6 PG — LOW (ref 27–34)
MCHC RBC-ENTMCNC: 33.5 % — SIGNIFICANT CHANGE UP (ref 32–36)
MCV RBC AUTO: 79.6 FL — LOW (ref 80–100)
METHOD TYPE: SIGNIFICANT CHANGE UP
MONOCYTES # BLD AUTO: 0.77 K/UL — SIGNIFICANT CHANGE UP (ref 0–0.9)
MONOCYTES NFR BLD AUTO: 5.5 % — SIGNIFICANT CHANGE UP (ref 2–14)
NEUTROPHILS # BLD AUTO: 11.72 K/UL — HIGH (ref 1.8–7.4)
NEUTROPHILS NFR BLD AUTO: 83.9 % — HIGH (ref 43–77)
NRBC # FLD: 0 K/UL — SIGNIFICANT CHANGE UP (ref 0–0)
ORGANISM # SPEC MICROSCOPIC CNT: SIGNIFICANT CHANGE UP
ORGANISM # SPEC MICROSCOPIC CNT: SIGNIFICANT CHANGE UP
PLATELET # BLD AUTO: 410 K/UL — HIGH (ref 150–400)
PMV BLD: 10.5 FL — SIGNIFICANT CHANGE UP (ref 7–13)
RBC # BLD: 3.53 M/UL — LOW (ref 3.8–5.2)
RBC # FLD: 16.4 % — HIGH (ref 10.3–14.5)
WBC # BLD: 13.97 K/UL — HIGH (ref 3.8–10.5)
WBC # FLD AUTO: 13.97 K/UL — HIGH (ref 3.8–10.5)

## 2019-11-08 PROCEDURE — 99254 IP/OBS CNSLTJ NEW/EST MOD 60: CPT

## 2019-11-08 RX ADMIN — SIMETHICONE 80 MILLIGRAM(S): 80 TABLET, CHEWABLE ORAL at 10:56

## 2019-11-08 RX ADMIN — Medication 100 GRAM(S): at 05:11

## 2019-11-08 RX ADMIN — Medication 100 MILLIGRAM(S): at 01:20

## 2019-11-08 RX ADMIN — ONDANSETRON 8 MILLIGRAM(S): 8 TABLET, FILM COATED ORAL at 12:58

## 2019-11-08 RX ADMIN — Medication 100 MICROGRAM(S): at 05:13

## 2019-11-08 RX ADMIN — Medication 100 MILLIGRAM(S): at 17:19

## 2019-11-08 RX ADMIN — Medication 10 MILLIGRAM(S): at 01:19

## 2019-11-08 RX ADMIN — Medication 600 MILLIGRAM(S): at 11:45

## 2019-11-08 RX ADMIN — Medication 600 MILLIGRAM(S): at 23:16

## 2019-11-08 RX ADMIN — Medication 100 MILLIGRAM(S): at 09:45

## 2019-11-08 RX ADMIN — Medication 975 MILLIGRAM(S): at 12:58

## 2019-11-08 RX ADMIN — Medication 975 MILLIGRAM(S): at 18:42

## 2019-11-08 RX ADMIN — Medication 600 MILLIGRAM(S): at 17:20

## 2019-11-08 RX ADMIN — ONDANSETRON 8 MILLIGRAM(S): 8 TABLET, FILM COATED ORAL at 05:13

## 2019-11-08 RX ADMIN — Medication 10 MILLIGRAM(S): at 17:19

## 2019-11-08 RX ADMIN — Medication 975 MILLIGRAM(S): at 05:15

## 2019-11-08 RX ADMIN — Medication 600 MILLIGRAM(S): at 10:52

## 2019-11-08 RX ADMIN — ENOXAPARIN SODIUM 40 MILLIGRAM(S): 100 INJECTION SUBCUTANEOUS at 12:50

## 2019-11-08 RX ADMIN — Medication 110 MILLIGRAM(S): at 10:56

## 2019-11-08 RX ADMIN — FAMOTIDINE 20 MILLIGRAM(S): 10 INJECTION INTRAVENOUS at 12:59

## 2019-11-08 RX ADMIN — Medication 975 MILLIGRAM(S): at 01:17

## 2019-11-08 RX ADMIN — Medication 975 MILLIGRAM(S): at 13:27

## 2019-11-08 RX ADMIN — Medication 600 MILLIGRAM(S): at 03:45

## 2019-11-08 RX ADMIN — Medication 600 MILLIGRAM(S): at 03:09

## 2019-11-08 RX ADMIN — Medication 110 MILLIGRAM(S): at 23:16

## 2019-11-08 RX ADMIN — Medication 975 MILLIGRAM(S): at 00:02

## 2019-11-08 RX ADMIN — Medication 975 MILLIGRAM(S): at 05:13

## 2019-11-08 RX ADMIN — Medication 100 GRAM(S): at 17:15

## 2019-11-08 RX ADMIN — Medication 10 MILLIGRAM(S): at 10:52

## 2019-11-08 RX ADMIN — ONDANSETRON 8 MILLIGRAM(S): 8 TABLET, FILM COATED ORAL at 21:02

## 2019-11-08 NOTE — PROGRESS NOTE ADULT - SUBJECTIVE AND OBJECTIVE BOX
Gyn Progress Note POD #2 HD#5    Subjective:   Pt seen and examined at bedside. No events overnight. Pain well controlled. Patient ambulating. Not passing flatus yet. Tolerating regular diet. Pt denies fever, chills, chest pain, SOB, nausea, vomiting, lightheadedness, dizziness.      Objective:  T(F): 98.7 (11-08-19 @ 05:04), Max: 98.7 (11-08-19 @ 05:04)  HR: 85 (11-08-19 @ 05:04) (66 - 85)  BP: 100/57 (11-08-19 @ 05:04) (92/54 - 114/54)  RR: 16 (11-08-19 @ 05:04) (16 - 17)  SpO2: 98% (11-08-19 @ 05:04) (98% - 100%)    I&O's Summary    07 Nov 2019 07:01  -  08 Nov 2019 07:00  --------------------------------------------------------  IN: 600 mL / OUT: 1900 mL / NET: -1300 mL      MEDICATIONS  (STANDING):  acetaminophen   Tablet .. 975 milliGRAM(s) Oral every 6 hours  cefoTEtan  IVPB 2 Gram(s) IV Intermittent every 12 hours  doxycycline IVPB 100 milliGRAM(s) IV Intermittent every 12 hours  enoxaparin Injectable 40 milliGRAM(s) SubCutaneous daily  famotidine    Tablet 20 milliGRAM(s) Oral daily  ibuprofen  Tablet. 600 milliGRAM(s) Oral every 6 hours  levothyroxine 100 MICROGram(s) Oral daily  metoclopramide 10 milliGRAM(s) Oral every 8 hours  metroNIDAZOLE  IVPB 500 milliGRAM(s) IV Intermittent every 8 hours  ondansetron    Tablet 8 milliGRAM(s) Oral every 8 hours    MEDICATIONS  (PRN):  oxyCODONE    IR 5 milliGRAM(s) Oral every 4 hours PRN Moderate Pain (4 - 6)  oxyCODONE    IR 10 milliGRAM(s) Oral every 6 hours PRN Severe Pain (7 - 10)  senna 2 Tablet(s) Oral at bedtime PRN Constipation  simethicone 80 milliGRAM(s) Chew every 8 hours PRN Gas      Physical Exam:  Constitutional: NAD, A+O x3  CV: RRR  Lungs: clear to auscultation bilaterally  Abdomen: soft, moderately distended, no guarding, no rebound, normal bowel sounds  Incision: clean, dry, intact  Extremities: no lower extremity edema or calf tenderness bilaterally; venodynes in place    LABS:                        9.4    13.97 )-----------( 410      ( 08 Nov 2019 04:18 )             28.1       PT/INR - ( 06 Nov 2019 08:22 )   PT: 16.5 SEC;   INR: 1.43          PTT - ( 06 Nov 2019 08:22 )  PTT:31.3 SEC

## 2019-11-08 NOTE — CONSULT NOTE ADULT - SUBJECTIVE AND OBJECTIVE BOX
HPI:    50 yo  LMP 11/3 coming in with abdominal pain, fevers for one week, and dysuria. Patient came from [facility] three days ago and was recently diagnosed with pyelonephritis there. She had been placed on Keflex.    Imaging showed bilateral tubo-ovarian abscesses with peritonitis, thrombosis ovarian vein.  s/p LISA/ BSO .  OR culture growing ESBL E coli   Feels OK today.  c/o some pain at surgical site.      All: NKDA (2019 05:36)      PAST MEDICAL & SURGICAL HISTORY:  Hypothyroid  H/O tubal ligation      Allergies    No Known Allergies    Intolerances        ANTIMICROBIALS:  cefoTEtan  IVPB 2 every 12 hours  doxycycline IVPB 100 every 12 hours  metroNIDAZOLE  IVPB 500 every 8 hours      OTHER MEDS:  acetaminophen   Tablet .. 975 milliGRAM(s) Oral every 6 hours  enoxaparin Injectable 40 milliGRAM(s) SubCutaneous daily  famotidine    Tablet 20 milliGRAM(s) Oral daily  ibuprofen  Tablet. 600 milliGRAM(s) Oral every 6 hours  levothyroxine 100 MICROGram(s) Oral daily  metoclopramide 10 milliGRAM(s) Oral every 8 hours  ondansetron    Tablet 8 milliGRAM(s) Oral every 8 hours  oxyCODONE    IR 5 milliGRAM(s) Oral every 4 hours PRN  oxyCODONE    IR 10 milliGRAM(s) Oral every 6 hours PRN  senna 2 Tablet(s) Oral at bedtime PRN  simethicone 80 milliGRAM(s) Chew every 8 hours PRN      SOCIAL HISTORY:  travel from in [facility]    FAMILY HISTORY:  No pertinent family history in first degree relatives      REVIEW OF SYSTEMS  [  ] ROS unobtainable because:    [  ] All other systems negative except as noted below:	    Constitutional:  [ ] fever [ ] chills  [ ] weight loss  [ ] weakness  Skin:  [ ] rash [ ] phlebitis	  Eyes: [ ] icterus [ ] pain  [ ] discharge	  ENMT: [ ] sore throat  [ ] thrush [ ] ulcers [ ] exudates  Respiratory: [ ] dyspnea [ ] hemoptysis [ ] cough [ ] sputum	  Cardiovascular:  [ ] chest pain [ ] palpitations [ ] edema	  Gastrointestinal:  [ ] nausea [ ] vomiting [ ] diarrhea [ ] constipation [ ] pain	  Genitourinary:  [ ] dysuria [ ] frequency [ ] hematuria [ ] discharge [ ] flank pain  [ ] incontinence  pain  Musculoskeletal:  [ ] myalgias [ ] arthralgias [ ] arthritis  [ ] back pain  Neurological:  [ ] headache [ ] seizures  [ ] confusion/altered mental status  Psychiatric:  [ ] anxiety [ ] depression	  Hematology/Lymphatics:  [ ] lymphadenopathy  Endocrine:  [ ] adrenal [ ] thyroid  Allergic/Immunologic:	 [ ] transplant [ ] seasonal    PHYSICAL EXAM:  General:  non-toxic  HEAD/EYES:  white sclera   ENT:   supple   Cardiovascular:  S1S2  Respiratory:  clear to ausculation bilaterally  GI: slight distended,  soft, mid line suprapubic wound no erythema, drainage  : no quezada    Musculoskeletal:  no synovitis  Neurologic:   non-focal exam   Skin:   no rash  Psychiatric:  [x ] appropriate affect [x ] alert & oriented  Lines:  [x ] no phlebitis           Drug Dosing Weight  Height (cm): 165.1 (2019 10:03)  Weight (kg): 67 (2019 10:03)  BMI (kg/m2): 24.6 (2019 10:03)  BSA (m2): 1.74 (2019 10:03)    Vital Signs Last 24 Hrs  T(F): 98.5 (19 @ 13:26), Max: 101 (19 @ 03:46)    Vital Signs Last 24 Hrs  HR: 87 (19 @ 13:26) (76 - 93)  BP: 102/53 (19 @ 13:26) (92/54 - 102/55)  RR: 16 (19 @ 13:26)  SpO2: 100% (19 @ 13:26) (98% - 100%)  Wt(kg): --                          9.4    13.97 )-----------( 410      ( 2019 04:18 )             28.1                   MICROBIOLOGY:  PELVIS  19 --  --  E.COLI ESBL POSITIVE      BLOOD PERIPHERAL  19 --  --  --      URINE MIDSTREAM  19 --  --  --      RADIOLOGY:    < from: CT Abdomen and Pelvis w/ Oral Cont and w/ IV Cont (19 @ 03:37) >    INTERPRETATION:  CLINICAL INFORMATION: Diffuse abdominal pain    COMPARISON: None.    PROCEDURE:   CT of the Abdomen and Pelvis was performed with intravenous contrast.   Intravenous contrast: 90 ml Omnipaque 350. 10 ml discarded.  Oral contrast: positive contrast was administered.  Sagittal and coronal reformats were performed.    FINDINGS:    LOWER CHEST: Within normal limits    LIVER: Hypodense foci measuring 5 mmin segment 6 (2:53) and 4 mm in   segment 7 (2:17) are too small to characterize by CT scan.  BILE DUCTS: Normal caliber.  GALLBLADDER: Within normal limits.  SPLEEN: Within normal limits.  PANCREAS: Within normal limits.  ADRENALS: Within normal limits.  KIDNEYS/URETERS: Within normal limits.    BLADDER: Within normal limits.  REPRODUCTIVE ORGANS: There are dilated thick-walled and hyperemic   fallopian tubes with fluid collections bilaterally compatible with   salpingitis and tubo-ovarian abscesses.    A partly myometrial partly subserosal fibroid  A myometrial fibroid with small submucosal component in the anterior body   of uterus measures 2.5 cm (601:71).  A myometrial fibroid in the anterior right lower uterine segment measures   approximately 2 cm (601:67).    BOWEL: No bowel obstruction. Appendix is normal.  PERITONEUM: There is complex free fluid in the pelvis with subtle   peritoneal enhancement in the cul-de-sac, indicating peritonitis.  There   is mild abdominal ascites in theparacolic bladders bilaterally, around   the tip of the spleen and in the left subdiaphragmatic space.  VESSELS: Large filling defect in the proximal (inferior) aspect of the   right ovarian vein is compatible with acute thrombosis.  RETROPERITONEUM/LYMPH NODES: No lymphadenopathy.    ABDOMINAL WALL: Within normal limits.  BONES: Within normal limits.    IMPRESSION:     Bilateral salpingitis and tubo-ovarian abscesses with evidence of   peritonitis.  Acute thrombosis of the inferior aspect of theright   ovarian vein, for which anticoagulation may be warranted.    < end of copied text >

## 2019-11-08 NOTE — PROGRESS NOTE ADULT - ASSESSMENT
Assessment/Plan: 49y female HD#5 admitted with abd pain, fevers and dysuria, found to have bilateral TOAs, pelvic collections and incidental R ovarian vein thrombosis, initially placed on IV antibiotics. Patient demonstrated worsening clinical status on HD#3 and is now POD#2 s/p LISA, BSO and b/l ureterolysis. Patient is doing well this morning, she remains afebrile and her pain is well-controlled.

## 2019-11-08 NOTE — CONSULT NOTE ADULT - ASSESSMENT
48 yo woman presenting with bilateral tubo-ovarian abscesses with peritonitis, ovarian vein thrombosis.  s/p LISA/ BSO 11/6.  Improving.  Leucocytosis.  OR pelvic culture ESBL E coli.  Blood culture negative.    Suggest:  d/c cefotetan and flagyl  start ertapenem 1 gm iv q 24 h -> 11/12  trend WBC  continue doxycycline    ID service available for questions over weekend.    Jackie Salomon MD  Pager: [phone]  After 5 PM or weekends please call fellow on call or office [phone]

## 2019-11-08 NOTE — PROGRESS NOTE ADULT - PROBLEM SELECTOR PLAN 1
CV: Hemodynamically stable  Pulm: Saturating well on RA. Increase incentive spirometry.  GI: Tolerating regular diet. Continue anti-emetics, zofran and simethicone PRN.  : Voiding spontaneously  Heme: Continue Lovenox for DVT ppx and ovarian vein thrombosis. Venodynes when in bed. Increase OOB. Patient will require Lovenox or SQH for 6 weeks after discharge. Will discuss with patient and Social Work what her options and cost is when she returns to [facility].    ID: Currently afebrile and WBC downtrending. Plan to continue IV cefotetan (11/4-), IV doxycyline (11/4-), IV flagyl (11/4-). Intraop fluid cultures growing E. coli preliminarily. Will transition to PO antibiotics when cultures speciate.  Endo: Continue levothyroxine for h/o hypothyroidism.  Neuro: Continue oral meds for pain control.  FEN: SLIV. AM CBC stable.  Dispo: patient plans on returning to [facility] on 11/13. Will discuss proper follow-up with patient after discharge.    Nishi Kline, PGY-1.

## 2019-11-09 DIAGNOSIS — Z90.710 ACQUIRED ABSENCE OF BOTH CERVIX AND UTERUS: ICD-10-CM

## 2019-11-09 LAB
BACTERIA BLD CULT: SIGNIFICANT CHANGE UP
BACTERIA BLD CULT: SIGNIFICANT CHANGE UP
BASOPHILS # BLD AUTO: 0.05 K/UL — SIGNIFICANT CHANGE UP (ref 0–0.2)
BASOPHILS NFR BLD AUTO: 0.5 % — SIGNIFICANT CHANGE UP (ref 0–2)
EOSINOPHIL # BLD AUTO: 0.2 K/UL — SIGNIFICANT CHANGE UP (ref 0–0.5)
EOSINOPHIL NFR BLD AUTO: 2 % — SIGNIFICANT CHANGE UP (ref 0–6)
HCT VFR BLD CALC: 25.4 % — LOW (ref 34.5–45)
HCT VFR BLD CALC: 25.4 % — LOW (ref 34.5–45)
HGB BLD-MCNC: 8.2 G/DL — LOW (ref 11.5–15.5)
HGB BLD-MCNC: 8.4 G/DL — LOW (ref 11.5–15.5)
IMM GRANULOCYTES NFR BLD AUTO: 0.7 % — SIGNIFICANT CHANGE UP (ref 0–1.5)
LYMPHOCYTES # BLD AUTO: 1.22 K/UL — SIGNIFICANT CHANGE UP (ref 1–3.3)
LYMPHOCYTES # BLD AUTO: 12.3 % — LOW (ref 13–44)
MCHC RBC-ENTMCNC: 26.2 PG — LOW (ref 27–34)
MCHC RBC-ENTMCNC: 26.8 PG — LOW (ref 27–34)
MCHC RBC-ENTMCNC: 32.3 % — SIGNIFICANT CHANGE UP (ref 32–36)
MCHC RBC-ENTMCNC: 33.1 % — SIGNIFICANT CHANGE UP (ref 32–36)
MCV RBC AUTO: 80.9 FL — SIGNIFICANT CHANGE UP (ref 80–100)
MCV RBC AUTO: 81.2 FL — SIGNIFICANT CHANGE UP (ref 80–100)
MONOCYTES # BLD AUTO: 0.56 K/UL — SIGNIFICANT CHANGE UP (ref 0–0.9)
MONOCYTES NFR BLD AUTO: 5.6 % — SIGNIFICANT CHANGE UP (ref 2–14)
NEUTROPHILS # BLD AUTO: 7.84 K/UL — HIGH (ref 1.8–7.4)
NEUTROPHILS NFR BLD AUTO: 78.9 % — HIGH (ref 43–77)
NRBC # FLD: 0 K/UL — SIGNIFICANT CHANGE UP (ref 0–0)
NRBC # FLD: 0.02 K/UL — SIGNIFICANT CHANGE UP (ref 0–0)
PLATELET # BLD AUTO: 335 K/UL — SIGNIFICANT CHANGE UP (ref 150–400)
PLATELET # BLD AUTO: 369 K/UL — SIGNIFICANT CHANGE UP (ref 150–400)
PMV BLD: 10.1 FL — SIGNIFICANT CHANGE UP (ref 7–13)
PMV BLD: 9.7 FL — SIGNIFICANT CHANGE UP (ref 7–13)
RBC # BLD: 3.13 M/UL — LOW (ref 3.8–5.2)
RBC # BLD: 3.14 M/UL — LOW (ref 3.8–5.2)
RBC # FLD: 16.9 % — HIGH (ref 10.3–14.5)
RBC # FLD: 17.1 % — HIGH (ref 10.3–14.5)
WBC # BLD: 11.29 K/UL — HIGH (ref 3.8–10.5)
WBC # BLD: 9.94 K/UL — SIGNIFICANT CHANGE UP (ref 3.8–10.5)
WBC # FLD AUTO: 11.29 K/UL — HIGH (ref 3.8–10.5)
WBC # FLD AUTO: 9.94 K/UL — SIGNIFICANT CHANGE UP (ref 3.8–10.5)

## 2019-11-09 RX ORDER — FERROUS SULFATE 325(65) MG
325 TABLET ORAL THREE TIMES A DAY
Refills: 0 | Status: DISCONTINUED | OUTPATIENT
Start: 2019-11-09 | End: 2019-11-15

## 2019-11-09 RX ORDER — ASCORBIC ACID 60 MG
500 TABLET,CHEWABLE ORAL THREE TIMES A DAY
Refills: 0 | Status: DISCONTINUED | OUTPATIENT
Start: 2019-11-09 | End: 2019-11-15

## 2019-11-09 RX ORDER — ERTAPENEM SODIUM 1 G/1
1000 INJECTION, POWDER, LYOPHILIZED, FOR SOLUTION INTRAMUSCULAR; INTRAVENOUS ONCE
Refills: 0 | Status: COMPLETED | OUTPATIENT
Start: 2019-11-09 | End: 2019-11-09

## 2019-11-09 RX ORDER — ERTAPENEM SODIUM 1 G/1
1000 INJECTION, POWDER, LYOPHILIZED, FOR SOLUTION INTRAMUSCULAR; INTRAVENOUS EVERY 24 HOURS
Refills: 0 | Status: COMPLETED | OUTPATIENT
Start: 2019-11-10 | End: 2019-11-13

## 2019-11-09 RX ORDER — ERTAPENEM SODIUM 1 G/1
INJECTION, POWDER, LYOPHILIZED, FOR SOLUTION INTRAMUSCULAR; INTRAVENOUS
Refills: 0 | Status: COMPLETED | OUTPATIENT
Start: 2019-11-09 | End: 2019-11-13

## 2019-11-09 RX ADMIN — Medication 325 MILLIGRAM(S): at 11:28

## 2019-11-09 RX ADMIN — Medication 600 MILLIGRAM(S): at 06:04

## 2019-11-09 RX ADMIN — Medication 600 MILLIGRAM(S): at 17:22

## 2019-11-09 RX ADMIN — Medication 100 MILLIGRAM(S): at 01:20

## 2019-11-09 RX ADMIN — ONDANSETRON 8 MILLIGRAM(S): 8 TABLET, FILM COATED ORAL at 22:35

## 2019-11-09 RX ADMIN — Medication 110 MILLIGRAM(S): at 11:28

## 2019-11-09 RX ADMIN — Medication 975 MILLIGRAM(S): at 17:21

## 2019-11-09 RX ADMIN — Medication 100 GRAM(S): at 06:04

## 2019-11-09 RX ADMIN — Medication 975 MILLIGRAM(S): at 01:21

## 2019-11-09 RX ADMIN — Medication 325 MILLIGRAM(S): at 22:35

## 2019-11-09 RX ADMIN — Medication 110 MILLIGRAM(S): at 22:34

## 2019-11-09 RX ADMIN — Medication 500 MILLIGRAM(S): at 11:29

## 2019-11-09 RX ADMIN — Medication 600 MILLIGRAM(S): at 22:36

## 2019-11-09 RX ADMIN — Medication 100 MICROGRAM(S): at 06:04

## 2019-11-09 RX ADMIN — Medication 10 MILLIGRAM(S): at 17:22

## 2019-11-09 RX ADMIN — FAMOTIDINE 20 MILLIGRAM(S): 10 INJECTION INTRAVENOUS at 11:28

## 2019-11-09 RX ADMIN — Medication 500 MILLIGRAM(S): at 22:35

## 2019-11-09 RX ADMIN — ERTAPENEM SODIUM 120 MILLIGRAM(S): 1 INJECTION, POWDER, LYOPHILIZED, FOR SOLUTION INTRAMUSCULAR; INTRAVENOUS at 07:49

## 2019-11-09 RX ADMIN — ENOXAPARIN SODIUM 40 MILLIGRAM(S): 100 INJECTION SUBCUTANEOUS at 11:28

## 2019-11-09 RX ADMIN — ONDANSETRON 8 MILLIGRAM(S): 8 TABLET, FILM COATED ORAL at 11:31

## 2019-11-09 RX ADMIN — Medication 600 MILLIGRAM(S): at 23:45

## 2019-11-09 RX ADMIN — Medication 600 MILLIGRAM(S): at 11:27

## 2019-11-09 RX ADMIN — Medication 975 MILLIGRAM(S): at 11:28

## 2019-11-09 NOTE — PROVIDER CONTACT NOTE (OTHER) - ACTION/TREATMENT ORDERED:
MD made aware. No new orders received. Stated he would come by during rounds to check on patient. Will continue to monitor.
MD made aware. Said to give tylenol. Will come to see patient.. Will continue to monitor.
MD made aware. Stated it was fine. Will continue to monitor.
MD notified. To review chart.

## 2019-11-09 NOTE — PROGRESS NOTE ADULT - ASSESSMENT
49y female POD#3 s/P Total Abdominal Hysterectomy, Bilateral Salpingo-oophorectomy, abdominal washout for bilateral TOAs. On admission, patient was found to have R ovarian vein thrombosis with no evidence of PE. Patient is significantly clinically improved. Abdominal cultures are growing ESBL, antibiotic regimen adjusted this AM per ID.  Neuro: c/w current pain regimen  CV: Hemodynamically stable. Downtrending h/h, likely 2/2 equilibration after operative blood loss. Consider repeat, will start iron supplementation.  Pulm: Saturating well on RA. Increase incentive spirometry.  GI: Tolerating regular diet. Continue anti-emetics, zofran and simethicone PRN.  : Voiding spontaneously  Heme: Continue Lovenox for DVT ppx and ovarian vein thrombosis. Venodynes when in bed. Increase OOB. Patient will require Lovenox or SQH for 6 weeks after discharge. Patient to return to [facility], will arrange for lovenox with her primary doctor. Gyn team will arrange for lovenox teaching.  ID: Currently afebrile, clinically improved, and leukocytosis resolved. Intraop cultures growing ESBL and patient transitioned to ertapenem per ID. Appreciate recommendations. Will continue--along with doxy--until 11/12  Endo: Continue levothyroxine for h/o hypothyroidism.  FEN: SLIV.   Dispo: c/w inpatient management for IV antibiotics.    Violetta Diaz PGY2

## 2019-11-09 NOTE — PROGRESS NOTE ADULT - SUBJECTIVE AND OBJECTIVE BOX
R2 GYN Progress Note    POD#3   HD#6    Interval Events: Patient had an episode of lightheadedness/dizziness this AM upon waking. Vitals stable, resolved without intervention.    Patient seen and examined at bedside.  No acute events overnight. No acute complaints.  Pain well controlled.  Patient is ambulating and tolerating regular diet.  Patient is passing flatus.    Patient is voiding spontanously.   Denies CP, SOB, N/V, fevers, and chills.    Vital Signs Last 24 Hours  T(C): 36.8 (11-09-19 @ 05:02), Max: 37.2 (11-08-19 @ 18:08)  HR: 84 (11-09-19 @ 05:02) (84 - 94)  BP: 104/51 (11-09-19 @ 05:02) (90/47 - 104/59)  RR: 17 (11-09-19 @ 05:02) (16 - 18)  SpO2: 99% (11-09-19 @ 05:02) (96% - 100%)    I&O's Summary    08 Nov 2019 07:01  -  09 Nov 2019 07:00  --------------------------------------------------------  IN: 1300 mL / OUT: 1775 mL / NET: -475 mL        Physical Exam:  General: NAD  CV: RR, S1, S2, no M/R/G  Lungs: CTA b/l, good air flow b/l   Abdomen: Soft, appropriately-tender, softly distended, tympanic, normoactive bowel sounds  Incision: midline vertical incision CDI with steristrips in place  : no bleeding on pad  Ext: No pain or swelling     Labs:                        8.4    9.94  )-----------( 335      ( 09 Nov 2019 05:25 )             25.4   baso 0.5    eos 2.0    imm gran 0.7    lymph 12.3   mono 5.6    poly 78.9                         9.4    13.97 )-----------( 410      ( 08 Nov 2019 04:18 )             28.1   baso 0.4    eos 0.9    imm gran 0.6    lymph 8.7    mono 5.5    poly 83.9                         12.4   17.00 )-----------( 265      ( 07 Nov 2019 06:00 )             38.0   baso 0.2    eos 0.0    imm gran 0.6    lymph 5.8    mono 3.5    poly 89.9                         9.5    21.01 )-----------( 328      ( 06 Nov 2019 21:30 )             29.7   baso 0.1    eos 0.0    imm gran 1.1    lymph 2.7    mono 2.4    poly 93.7       MEDICATIONS  (STANDING):  acetaminophen   Tablet .. 975 milliGRAM(s) Oral every 6 hours  doxycycline IVPB 100 milliGRAM(s) IV Intermittent every 12 hours  enoxaparin Injectable 40 milliGRAM(s) SubCutaneous daily  ertapenem  IVPB      famotidine    Tablet 20 milliGRAM(s) Oral daily  ibuprofen  Tablet. 600 milliGRAM(s) Oral every 6 hours  levothyroxine 100 MICROGram(s) Oral daily  metoclopramide 10 milliGRAM(s) Oral every 8 hours  ondansetron    Tablet 8 milliGRAM(s) Oral every 8 hours    MEDICATIONS  (PRN):  oxyCODONE    IR 5 milliGRAM(s) Oral every 4 hours PRN Moderate Pain (4 - 6)  oxyCODONE    IR 10 milliGRAM(s) Oral every 6 hours PRN Severe Pain (7 - 10)  senna 2 Tablet(s) Oral at bedtime PRN Constipation  simethicone 80 milliGRAM(s) Chew every 8 hours PRN Gas

## 2019-11-10 LAB
BASOPHILS # BLD AUTO: 0.05 K/UL — SIGNIFICANT CHANGE UP (ref 0–0.2)
BASOPHILS NFR BLD AUTO: 0.5 % — SIGNIFICANT CHANGE UP (ref 0–2)
EOSINOPHIL # BLD AUTO: 0.3 K/UL — SIGNIFICANT CHANGE UP (ref 0–0.5)
EOSINOPHIL NFR BLD AUTO: 2.8 % — SIGNIFICANT CHANGE UP (ref 0–6)
HCT VFR BLD CALC: 27 % — LOW (ref 34.5–45)
HGB BLD-MCNC: 8.7 G/DL — LOW (ref 11.5–15.5)
IMM GRANULOCYTES NFR BLD AUTO: 1.4 % — SIGNIFICANT CHANGE UP (ref 0–1.5)
LYMPHOCYTES # BLD AUTO: 1.29 K/UL — SIGNIFICANT CHANGE UP (ref 1–3.3)
LYMPHOCYTES # BLD AUTO: 12.1 % — LOW (ref 13–44)
MCHC RBC-ENTMCNC: 26.7 PG — LOW (ref 27–34)
MCHC RBC-ENTMCNC: 32.2 % — SIGNIFICANT CHANGE UP (ref 32–36)
MCV RBC AUTO: 82.8 FL — SIGNIFICANT CHANGE UP (ref 80–100)
MONOCYTES # BLD AUTO: 0.58 K/UL — SIGNIFICANT CHANGE UP (ref 0–0.9)
MONOCYTES NFR BLD AUTO: 5.4 % — SIGNIFICANT CHANGE UP (ref 2–14)
NEUTROPHILS # BLD AUTO: 8.32 K/UL — HIGH (ref 1.8–7.4)
NEUTROPHILS NFR BLD AUTO: 77.8 % — HIGH (ref 43–77)
NRBC # FLD: 0 K/UL — SIGNIFICANT CHANGE UP (ref 0–0)
PLATELET # BLD AUTO: 389 K/UL — SIGNIFICANT CHANGE UP (ref 150–400)
PMV BLD: 9.7 FL — SIGNIFICANT CHANGE UP (ref 7–13)
RBC # BLD: 3.26 M/UL — LOW (ref 3.8–5.2)
RBC # FLD: 17.2 % — HIGH (ref 10.3–14.5)
WBC # BLD: 10.69 K/UL — HIGH (ref 3.8–10.5)
WBC # FLD AUTO: 10.69 K/UL — HIGH (ref 3.8–10.5)

## 2019-11-10 RX ADMIN — Medication 975 MILLIGRAM(S): at 17:52

## 2019-11-10 RX ADMIN — Medication 100 MICROGRAM(S): at 06:41

## 2019-11-10 RX ADMIN — Medication 500 MILLIGRAM(S): at 06:41

## 2019-11-10 RX ADMIN — Medication 975 MILLIGRAM(S): at 06:41

## 2019-11-10 RX ADMIN — Medication 500 MILLIGRAM(S): at 22:02

## 2019-11-10 RX ADMIN — Medication 975 MILLIGRAM(S): at 11:54

## 2019-11-10 RX ADMIN — Medication 975 MILLIGRAM(S): at 01:34

## 2019-11-10 RX ADMIN — Medication 10 MILLIGRAM(S): at 11:54

## 2019-11-10 RX ADMIN — Medication 975 MILLIGRAM(S): at 01:01

## 2019-11-10 RX ADMIN — Medication 325 MILLIGRAM(S): at 22:03

## 2019-11-10 RX ADMIN — Medication 325 MILLIGRAM(S): at 11:54

## 2019-11-10 RX ADMIN — Medication 600 MILLIGRAM(S): at 17:52

## 2019-11-10 RX ADMIN — Medication 325 MILLIGRAM(S): at 06:42

## 2019-11-10 RX ADMIN — Medication 975 MILLIGRAM(S): at 08:08

## 2019-11-10 RX ADMIN — Medication 600 MILLIGRAM(S): at 08:08

## 2019-11-10 RX ADMIN — ENOXAPARIN SODIUM 40 MILLIGRAM(S): 100 INJECTION SUBCUTANEOUS at 11:55

## 2019-11-10 RX ADMIN — Medication 600 MILLIGRAM(S): at 06:43

## 2019-11-10 RX ADMIN — FAMOTIDINE 20 MILLIGRAM(S): 10 INJECTION INTRAVENOUS at 11:54

## 2019-11-10 RX ADMIN — ERTAPENEM SODIUM 120 MILLIGRAM(S): 1 INJECTION, POWDER, LYOPHILIZED, FOR SOLUTION INTRAMUSCULAR; INTRAVENOUS at 06:47

## 2019-11-10 RX ADMIN — Medication 100 MILLIGRAM(S): at 11:56

## 2019-11-10 RX ADMIN — Medication 500 MILLIGRAM(S): at 11:54

## 2019-11-10 RX ADMIN — Medication 600 MILLIGRAM(S): at 11:54

## 2019-11-10 NOTE — PROGRESS NOTE ADULT - ASSESSMENT
49y female POD#3 s/P Total Abdominal Hysterectomy, Bilateral Salpingo-oophorectomy, abdominal washout for bilateral TOAs. On admission, patient was found to have R ovarian vein thrombosis with no evidence of PE. Patient is significantly clinically improved. Abdominal cultures are growing ESBL, antibiotic regimen per ID.      Neuro: c/w current pain regimen  CV: Hemodynamically stable. Patient is anemic, H/H stable. c/w daily CBC and iron supplementation  Pulm: Saturating well on RA. Increase incentive spirometry.  GI: Tolerating regular diet. Continue anti-emetics, zofran and simethicone PRN.  : Voiding spontaneously  Heme: Continue Lovenox for DVT ppx and ovarian vein thrombosis. Venodynes when in bed. Increase OOB. Patient will require Lovenox or SQH for 6 weeks after discharge. Patient to return to [facility], will arrange for lovenox with her primary doctor. Gyn team will arrange for lovenox teaching.  ID: Currently afebrile, clinically improved, and leukocytosis resolved. Intraop cultures growing ESBL and patient transitioned to ertapenem per ID. Appreciate recommendations. Will continue--along with doxy--until 11/12. Transition doxy to PO given equivalent bioavailability.  Endo: Continue levothyroxine for h/o hypothyroidism.  FEN: SLIV.   Dispo: c/w inpatient management for IV antibiotics.    Violetta Diaz PGY2

## 2019-11-10 NOTE — PROGRESS NOTE ADULT - SUBJECTIVE AND OBJECTIVE BOX
R2 GYN Progress Note    POD#4   HD#7    Interval Events: no further episodes of lightheadedness, dizziness    Patient seen and examined at bedside.  No acute events overnight. No acute complaints.  Pain well controlled.  Patient is ambulating and tolerating regular diet  Patient is passing flatus.    Patient is voiding spontanously   Denies CP, SOB, N/V, fevers, and chills.    Vital Signs Last 24 Hours  T(C): 36.8 (11-10-19 @ 06:50), Max: 36.9 (11-09-19 @ 14:00)  HR: 72 (11-10-19 @ 06:50) (70 - 91)  BP: 100/55 (11-10-19 @ 06:50) (100/55 - 116/66)  RR: 16 (11-10-19 @ 06:50) (16 - 18)  SpO2: 100% (11-10-19 @ 06:50) (99% - 100%)    I&O's Summary    09 Nov 2019 07:01  -  10 Nov 2019 07:00  --------------------------------------------------------  IN: 100 mL / OUT: 300 mL / NET: -200 mL        Physical Exam:  General: NAD  CV: RR, S1, S2, no M/R/G  Lungs: CTA b/l, good air flow b/l   Abdomen: Soft, appropriately-tender, softly distended, tympanic, normoactive bowel sounds  Incision: midline vertical incision CDI with steristrips in place  : no bleeding on pad  Ext: No pain or swelling     Labs:                        8.7    10.69 )-----------( 389      ( 10 Nov 2019 05:58 )             27.0   baso 0.5    eos 2.8    imm gran 1.4    lymph 12.1   mono 5.4    poly 77.8                         8.2    11.29 )-----------( 369      ( 09 Nov 2019 12:40 )             25.4   baso x      eos x      imm gran x      lymph x      mono x      poly x                            8.4    9.94  )-----------( 335      ( 09 Nov 2019 05:25 )             25.4   baso 0.5    eos 2.0    imm gran 0.7    lymph 12.3   mono 5.6    poly 78.9                         9.4    13.97 )-----------( 410      ( 08 Nov 2019 04:18 )             28.1   baso 0.4    eos 0.9    imm gran 0.6    lymph 8.7    mono 5.5    poly 83.9       MEDICATIONS  (STANDING):  acetaminophen   Tablet .. 975 milliGRAM(s) Oral every 6 hours  ascorbic acid 500 milliGRAM(s) Oral three times a day  doxycycline IVPB 100 milliGRAM(s) IV Intermittent every 12 hours  enoxaparin Injectable 40 milliGRAM(s) SubCutaneous daily  ertapenem  IVPB 1000 milliGRAM(s) IV Intermittent every 24 hours  ertapenem  IVPB      famotidine    Tablet 20 milliGRAM(s) Oral daily  ferrous    sulfate 325 milliGRAM(s) Oral three times a day  ibuprofen  Tablet. 600 milliGRAM(s) Oral every 6 hours  levothyroxine 100 MICROGram(s) Oral daily  metoclopramide 10 milliGRAM(s) Oral every 8 hours  ondansetron    Tablet 8 milliGRAM(s) Oral every 8 hours    MEDICATIONS  (PRN):  oxyCODONE    IR 5 milliGRAM(s) Oral every 4 hours PRN Moderate Pain (4 - 6)  oxyCODONE    IR 10 milliGRAM(s) Oral every 6 hours PRN Severe Pain (7 - 10)  senna 2 Tablet(s) Oral at bedtime PRN Constipation  simethicone 80 milliGRAM(s) Chew every 8 hours PRN Gas

## 2019-11-11 LAB
BASOPHILS # BLD AUTO: 0.04 K/UL — SIGNIFICANT CHANGE UP (ref 0–0.2)
BASOPHILS NFR BLD AUTO: 0.4 % — SIGNIFICANT CHANGE UP (ref 0–2)
C DIFF TOX GENS STL QL NAA+PROBE: SIGNIFICANT CHANGE UP
EOSINOPHIL # BLD AUTO: 0.27 K/UL — SIGNIFICANT CHANGE UP (ref 0–0.5)
EOSINOPHIL NFR BLD AUTO: 2.7 % — SIGNIFICANT CHANGE UP (ref 0–6)
HCT VFR BLD CALC: 26.3 % — LOW (ref 34.5–45)
HGB BLD-MCNC: 8.5 G/DL — LOW (ref 11.5–15.5)
IMM GRANULOCYTES NFR BLD AUTO: 1.8 % — HIGH (ref 0–1.5)
LYMPHOCYTES # BLD AUTO: 1.64 K/UL — SIGNIFICANT CHANGE UP (ref 1–3.3)
LYMPHOCYTES # BLD AUTO: 16.4 % — SIGNIFICANT CHANGE UP (ref 13–44)
MCHC RBC-ENTMCNC: 26.4 PG — LOW (ref 27–34)
MCHC RBC-ENTMCNC: 32.3 % — SIGNIFICANT CHANGE UP (ref 32–36)
MCV RBC AUTO: 81.7 FL — SIGNIFICANT CHANGE UP (ref 80–100)
MONOCYTES # BLD AUTO: 0.7 K/UL — SIGNIFICANT CHANGE UP (ref 0–0.9)
MONOCYTES NFR BLD AUTO: 7 % — SIGNIFICANT CHANGE UP (ref 2–14)
NEUTROPHILS # BLD AUTO: 7.14 K/UL — SIGNIFICANT CHANGE UP (ref 1.8–7.4)
NEUTROPHILS NFR BLD AUTO: 71.7 % — SIGNIFICANT CHANGE UP (ref 43–77)
NRBC # FLD: 0 K/UL — SIGNIFICANT CHANGE UP (ref 0–0)
PLATELET # BLD AUTO: 407 K/UL — HIGH (ref 150–400)
PMV BLD: 9.8 FL — SIGNIFICANT CHANGE UP (ref 7–13)
RBC # BLD: 3.22 M/UL — LOW (ref 3.8–5.2)
RBC # FLD: 17.5 % — HIGH (ref 10.3–14.5)
WBC # BLD: 9.97 K/UL — SIGNIFICANT CHANGE UP (ref 3.8–10.5)
WBC # FLD AUTO: 9.97 K/UL — SIGNIFICANT CHANGE UP (ref 3.8–10.5)

## 2019-11-11 PROCEDURE — 99222 1ST HOSP IP/OBS MODERATE 55: CPT | Mod: GC

## 2019-11-11 RX ORDER — LACTOBACILLUS ACIDOPHILUS 100MM CELL
1 CAPSULE ORAL DAILY
Refills: 0 | Status: DISCONTINUED | OUTPATIENT
Start: 2019-11-11 | End: 2019-11-15

## 2019-11-11 RX ORDER — ENOXAPARIN SODIUM 100 MG/ML
40 INJECTION SUBCUTANEOUS
Qty: 40 | Refills: 0
Start: 2019-11-11 | End: 2019-12-17

## 2019-11-11 RX ADMIN — Medication 500 MILLIGRAM(S): at 13:53

## 2019-11-11 RX ADMIN — Medication 975 MILLIGRAM(S): at 23:57

## 2019-11-11 RX ADMIN — ERTAPENEM SODIUM 120 MILLIGRAM(S): 1 INJECTION, POWDER, LYOPHILIZED, FOR SOLUTION INTRAMUSCULAR; INTRAVENOUS at 05:28

## 2019-11-11 RX ADMIN — Medication 100 MILLIGRAM(S): at 17:31

## 2019-11-11 RX ADMIN — Medication 500 MILLIGRAM(S): at 05:18

## 2019-11-11 RX ADMIN — Medication 975 MILLIGRAM(S): at 02:30

## 2019-11-11 RX ADMIN — ENOXAPARIN SODIUM 40 MILLIGRAM(S): 100 INJECTION SUBCUTANEOUS at 12:51

## 2019-11-11 RX ADMIN — Medication 10 MILLIGRAM(S): at 01:23

## 2019-11-11 RX ADMIN — Medication 600 MILLIGRAM(S): at 06:00

## 2019-11-11 RX ADMIN — FAMOTIDINE 20 MILLIGRAM(S): 10 INJECTION INTRAVENOUS at 13:53

## 2019-11-11 RX ADMIN — Medication 100 MILLIGRAM(S): at 01:22

## 2019-11-11 RX ADMIN — Medication 325 MILLIGRAM(S): at 13:53

## 2019-11-11 RX ADMIN — Medication 1 TABLET(S): at 22:31

## 2019-11-11 RX ADMIN — Medication 975 MILLIGRAM(S): at 05:18

## 2019-11-11 RX ADMIN — Medication 600 MILLIGRAM(S): at 23:10

## 2019-11-11 RX ADMIN — Medication 325 MILLIGRAM(S): at 05:18

## 2019-11-11 RX ADMIN — Medication 600 MILLIGRAM(S): at 05:18

## 2019-11-11 RX ADMIN — Medication 975 MILLIGRAM(S): at 01:22

## 2019-11-11 RX ADMIN — Medication 325 MILLIGRAM(S): at 22:31

## 2019-11-11 RX ADMIN — Medication 100 MICROGRAM(S): at 05:18

## 2019-11-11 RX ADMIN — Medication 500 MILLIGRAM(S): at 22:31

## 2019-11-11 RX ADMIN — Medication 600 MILLIGRAM(S): at 22:31

## 2019-11-11 RX ADMIN — Medication 975 MILLIGRAM(S): at 06:00

## 2019-11-11 NOTE — CONSULT NOTE ADULT - ATTENDING COMMENTS
Given no clinical improvement with medical management, recommend surgical intervention for bilateral TOA  Risks/benefits discussed  Will assist with surgery as needed
50y/o Polish speaking F p/w b/l tubo-ovarian abscesses and right ovarian vein thrombosis now s/p LISA, b/l salpingo-oopherectomy currently POD #5     - course complicated by ESBL+ in abdominal wound cultures  - Right ovarian vein thrombosis (OVT)- given provoked event due to infection, no clear role for full dose AC.   - cont px lovenox while inpt  - dispo when cleared by ID  - outpt f/u with pcp, consider abdominal/pelvic US as outpt to assess recovery and resolution of OVT.  - pt advised to retrieve medical records post dc to take with her to [place]

## 2019-11-11 NOTE — CONSULT NOTE ADULT - SUBJECTIVE AND OBJECTIVE BOX
REASON FOR CONSULTATION:    HPI:    REVIEW OF SYSTEMS:    CONSTITUTIONAL: No weakness, fevers or chills  EYES/ENT: No visual changes;  No vertigo or throat pain   NECK: No pain or stiffness  RESPIRATORY: No cough, wheezing, hemoptysis; No shortness of breath  CARDIOVASCULAR: No chest pain or palpitations  GASTROINTESTINAL: No abdominal or epigastric pain. No nausea, vomiting, or hematemesis; No diarrhea or constipation. No melena or hematochezia.  GENITOURINARY: No dysuria, frequency or hematuria  NEUROLOGICAL: No numbness or weakness  SKIN: No itching, burning, rashes, or lesions   All other review of systems is negative unless indicated above.    Allergies    No Known Allergies    Intolerances        MEDICATIONS  (STANDING):  acetaminophen   Tablet .. 975 milliGRAM(s) Oral every 6 hours  ascorbic acid 500 milliGRAM(s) Oral three times a day  doxycycline hyclate Capsule 100 milliGRAM(s) Oral every 12 hours  enoxaparin Injectable 40 milliGRAM(s) SubCutaneous daily  ertapenem  IVPB 1000 milliGRAM(s) IV Intermittent every 24 hours  ertapenem  IVPB      famotidine    Tablet 20 milliGRAM(s) Oral daily  ferrous    sulfate 325 milliGRAM(s) Oral three times a day  ibuprofen  Tablet. 600 milliGRAM(s) Oral every 6 hours  levothyroxine 100 MICROGram(s) Oral daily  metoclopramide 10 milliGRAM(s) Oral every 8 hours  ondansetron    Tablet 8 milliGRAM(s) Oral every 8 hours    MEDICATIONS  (PRN):  oxyCODONE    IR 5 milliGRAM(s) Oral every 4 hours PRN Moderate Pain (4 - 6)  oxyCODONE    IR 10 milliGRAM(s) Oral every 6 hours PRN Severe Pain (7 - 10)  senna 2 Tablet(s) Oral at bedtime PRN Constipation  simethicone 80 milliGRAM(s) Chew every 8 hours PRN Gas      Vital Signs Last 24 Hrs  T(C): 36.7 (11 Nov 2019 05:32), Max: 37.3 (10 Nov 2019 18:56)  T(F): 98.1 (11 Nov 2019 05:32), Max: 99.1 (10 Nov 2019 18:56)  HR: 71 (11 Nov 2019 05:32) (71 - 80)  BP: 112/64 (11 Nov 2019 05:32) (100/62 - 112/64)  BP(mean): --  RR: 16 (11 Nov 2019 05:32) (16 - 18)  SpO2: 100% (11 Nov 2019 05:32) (99% - 100%)    PHYSICAL EXAM:    General: AOx3, no acute distress  EYES: EOMI, PERRLA, conjunctiva and sclera clear  CHEST/LUNG: Clear to auscultation bilaterally; no wheezes, crackles or rales  HEART: Regular rate and rhythm; no murmurs  ABDOMEN: Soft, Nontender, Nondistended; BS+  LYMPH: No lymphadenopathy noted.  Extremities: No peripheral edema    LABS:                        8.5    9.97  )-----------( 407      ( 11 Nov 2019 05:21 )             26.3                     RADIOLOGY & ADDITIONAL STUDIES:    PATHOLOGY: REASON FOR CONSULTATION: Right ovarian vein thrombosis    HPI: 48y/o Armenian speaking F presented with fevers and abdominal pain found to have b/l tubo-ovarian abscesses and right ovarian vein thrombosis on CT abd/pelvis. Pt now s/p LISA, b/l salpingo-oopherectomy currently POD #5 placed on prophylactic lovenox course complicated by ESBL+ in abdominal wound cultures. Hematology consulted for management and duration of treatment.     Patient seen and examined at the bedside. Spoke with patient over  phone ID#[id number]. Patient denies any abdominal pain at this time. She stated that she had some acid reflux this morning when she received antibiotics without food but that has resolved. She denies any bleeding. She denies any personal history of bleeding disorders, however states that her son has gotten a hematological workup for persistent nosebleeds but all tests were negative.   No personal history of miscarriages or malignancies. She is primarily from [facility] and is staying with friends and family.     REVIEW OF SYSTEMS:    CONSTITUTIONAL: No weakness, fevers or chills  EYES/ENT: No visual changes;  No vertigo or throat pain   NECK: No pain or stiffness  RESPIRATORY: No cough, wheezing, hemoptysis; No shortness of breath  CARDIOVASCULAR: No chest pain or palpitations  GASTROINTESTINAL: No abdominal or epigastric pain. No nausea, vomiting, or hematemesis; No diarrhea or constipation. No melena or hematochezia.  GENITOURINARY: No dysuria, frequency or hematuria  NEUROLOGICAL: No numbness or weakness  SKIN: No itching, burning, rashes, or lesions   All other review of systems is negative unless indicated above.    Allergies    No Known Allergies    Intolerances        MEDICATIONS  (STANDING):  acetaminophen   Tablet .. 975 milliGRAM(s) Oral every 6 hours  ascorbic acid 500 milliGRAM(s) Oral three times a day  doxycycline hyclate Capsule 100 milliGRAM(s) Oral every 12 hours  enoxaparin Injectable 40 milliGRAM(s) SubCutaneous daily  ertapenem  IVPB 1000 milliGRAM(s) IV Intermittent every 24 hours  ertapenem  IVPB      famotidine    Tablet 20 milliGRAM(s) Oral daily  ferrous    sulfate 325 milliGRAM(s) Oral three times a day  ibuprofen  Tablet. 600 milliGRAM(s) Oral every 6 hours  levothyroxine 100 MICROGram(s) Oral daily  metoclopramide 10 milliGRAM(s) Oral every 8 hours  ondansetron    Tablet 8 milliGRAM(s) Oral every 8 hours    MEDICATIONS  (PRN):  oxyCODONE    IR 5 milliGRAM(s) Oral every 4 hours PRN Moderate Pain (4 - 6)  oxyCODONE    IR 10 milliGRAM(s) Oral every 6 hours PRN Severe Pain (7 - 10)  senna 2 Tablet(s) Oral at bedtime PRN Constipation  simethicone 80 milliGRAM(s) Chew every 8 hours PRN Gas      Vital Signs Last 24 Hrs  T(C): 36.7 (11 Nov 2019 05:32), Max: 37.3 (10 Nov 2019 18:56)  T(F): 98.1 (11 Nov 2019 05:32), Max: 99.1 (10 Nov 2019 18:56)  HR: 71 (11 Nov 2019 05:32) (71 - 80)  BP: 112/64 (11 Nov 2019 05:32) (100/62 - 112/64)  BP(mean): --  RR: 16 (11 Nov 2019 05:32) (16 - 18)  SpO2: 100% (11 Nov 2019 05:32) (99% - 100%)    PHYSICAL EXAM:    General: AOx3, no acute distress  EYES: EOMI, PERRLA, conjunctiva and sclera clear  CHEST/LUNG: Clear to auscultation bilaterally; no wheezes, crackles or rales  HEART: Regular rate and rhythm; no murmurs  ABDOMEN: Soft, Nontender, Nondistended; BS+  LYMPH: No lymphadenopathy noted.  Extremities: No peripheral edema    LABS:                        8.5    9.97  )-----------( 407      ( 11 Nov 2019 05:21 )             26.3                     RADIOLOGY & ADDITIONAL STUDIES:    PATHOLOGY: REASON FOR CONSULTATION: Right ovarian vein thrombosis    HPI: 48y/o Slovak speaking F with recent diagnosis of pyelonephritis in [facility] presented with fevers and abdominal pain found to have b/l tubo-ovarian abscesses and right ovarian vein thrombosis on CT abd/pelvis. Pt now s/p LISA, b/l salpingo-oopherectomy currently POD #5 placed on prophylactic lovenox course complicated by ESBL+ in abdominal wound cultures. Hematology consulted for management and duration of treatment.     Patient seen and examined at the bedside. Spoke with patient over  phone ID#[id number]. Patient denies any abdominal pain at this time. She stated that she had some acid reflux this morning when she received antibiotics without food but that has resolved. She denies any bleeding. She denies any personal history of bleeding disorders, however states that her son has gotten a hematological workup for persistent nosebleeds but all tests were negative.   No personal history of miscarriages or malignancies. She is primarily from [facility] and is staying with friends and family.     REVIEW OF SYSTEMS:    CONSTITUTIONAL: No weakness, fevers or chills  EYES/ENT: No visual changes;  No vertigo or throat pain   NECK: No pain or stiffness  RESPIRATORY: No cough, wheezing, hemoptysis; No shortness of breath  CARDIOVASCULAR: No chest pain or palpitations  GASTROINTESTINAL: No abdominal or epigastric pain. No nausea, vomiting, or hematemesis; No diarrhea or constipation. No melena or hematochezia.  GENITOURINARY: No dysuria, frequency or hematuria  NEUROLOGICAL: No numbness or weakness  SKIN: No itching, burning, rashes, or lesions   All other review of systems is negative unless indicated above.    Allergies    No Known Allergies    Intolerances        MEDICATIONS  (STANDING):  acetaminophen   Tablet .. 975 milliGRAM(s) Oral every 6 hours  ascorbic acid 500 milliGRAM(s) Oral three times a day  doxycycline hyclate Capsule 100 milliGRAM(s) Oral every 12 hours  enoxaparin Injectable 40 milliGRAM(s) SubCutaneous daily  ertapenem  IVPB 1000 milliGRAM(s) IV Intermittent every 24 hours  ertapenem  IVPB      famotidine    Tablet 20 milliGRAM(s) Oral daily  ferrous    sulfate 325 milliGRAM(s) Oral three times a day  ibuprofen  Tablet. 600 milliGRAM(s) Oral every 6 hours  levothyroxine 100 MICROGram(s) Oral daily  metoclopramide 10 milliGRAM(s) Oral every 8 hours  ondansetron    Tablet 8 milliGRAM(s) Oral every 8 hours    MEDICATIONS  (PRN):  oxyCODONE    IR 5 milliGRAM(s) Oral every 4 hours PRN Moderate Pain (4 - 6)  oxyCODONE    IR 10 milliGRAM(s) Oral every 6 hours PRN Severe Pain (7 - 10)  senna 2 Tablet(s) Oral at bedtime PRN Constipation  simethicone 80 milliGRAM(s) Chew every 8 hours PRN Gas      Vital Signs Last 24 Hrs  T(C): 36.7 (11 Nov 2019 05:32), Max: 37.3 (10 Nov 2019 18:56)  T(F): 98.1 (11 Nov 2019 05:32), Max: 99.1 (10 Nov 2019 18:56)  HR: 71 (11 Nov 2019 05:32) (71 - 80)  BP: 112/64 (11 Nov 2019 05:32) (100/62 - 112/64)  BP(mean): --  RR: 16 (11 Nov 2019 05:32) (16 - 18)  SpO2: 100% (11 Nov 2019 05:32) (99% - 100%)    PHYSICAL EXAM:    General: AOx3, no acute distress  EYES: EOMI, PERRLA, conjunctiva and sclera clear  CHEST/LUNG: Clear to auscultation bilaterally; no wheezes, crackles or rales  HEART: Regular rate and rhythm; no murmurs  ABDOMEN: Soft, Nontender, Nondistended; BS+  LYMPH: No lymphadenopathy noted.  Extremities: No peripheral edema    LABS:                        8.5    9.97  )-----------( 407      ( 11 Nov 2019 05:21 )             26.3                     RADIOLOGY & ADDITIONAL STUDIES:    PATHOLOGY: REASON FOR CONSULTATION: Right ovarian vein thrombosis    HPI: 50y/o Korean speaking F with recent diagnosis of pyelonephritis in [facility] presented with fevers and abdominal pain found to have b/l tubo-ovarian abscesses and right ovarian vein thrombosis on CT abd/pelvis. Pt now s/p LISA, b/l salpingo-oopherectomy currently POD #5 placed on prophylactic lovenox course complicated by ESBL+ in abdominal wound cultures. Hematology consulted for management and duration of treatment.     Patient seen and examined at the bedside. Spoke with patient over  phone ID#[id number]. Patient denies any abdominal pain at this time. She stated that she had some acid reflux this morning when she received antibiotics without food but that has resolved. She denies any bleeding. She denies any personal history of bleeding disorders, however states that her son has gotten a hematological workup for persistent nosebleeds but all tests were negative.   No personal history of miscarriages or malignancies. She is primarily from [facility] and is staying with friends and family.     REVIEW OF SYSTEMS:    CONSTITUTIONAL: No weakness, fevers or chills  EYES/ENT: No visual changes;  No vertigo or throat pain   NECK: No pain or stiffness  RESPIRATORY: No cough, wheezing, hemoptysis; No shortness of breath  CARDIOVASCULAR: No chest pain or palpitations  GASTROINTESTINAL: No abdominal or epigastric pain. No nausea, vomiting, or hematemesis; No diarrhea or constipation. No melena or hematochezia.  GENITOURINARY: No dysuria, frequency or hematuria  NEUROLOGICAL: No numbness or weakness  SKIN: No itching, burning, rashes, or lesions   All other review of systems is negative unless indicated above.    Allergies    No Known Allergies    Intolerances        MEDICATIONS  (STANDING):  acetaminophen   Tablet .. 975 milliGRAM(s) Oral every 6 hours  ascorbic acid 500 milliGRAM(s) Oral three times a day  doxycycline hyclate Capsule 100 milliGRAM(s) Oral every 12 hours  enoxaparin Injectable 40 milliGRAM(s) SubCutaneous daily  ertapenem  IVPB 1000 milliGRAM(s) IV Intermittent every 24 hours  ertapenem  IVPB      famotidine    Tablet 20 milliGRAM(s) Oral daily  ferrous    sulfate 325 milliGRAM(s) Oral three times a day  ibuprofen  Tablet. 600 milliGRAM(s) Oral every 6 hours  levothyroxine 100 MICROGram(s) Oral daily  metoclopramide 10 milliGRAM(s) Oral every 8 hours  ondansetron    Tablet 8 milliGRAM(s) Oral every 8 hours    MEDICATIONS  (PRN):  oxyCODONE    IR 5 milliGRAM(s) Oral every 4 hours PRN Moderate Pain (4 - 6)  oxyCODONE    IR 10 milliGRAM(s) Oral every 6 hours PRN Severe Pain (7 - 10)  senna 2 Tablet(s) Oral at bedtime PRN Constipation  simethicone 80 milliGRAM(s) Chew every 8 hours PRN Gas      Vital Signs Last 24 Hrs  T(C): 36.7 (11 Nov 2019 05:32), Max: 37.3 (10 Nov 2019 18:56)  T(F): 98.1 (11 Nov 2019 05:32), Max: 99.1 (10 Nov 2019 18:56)  HR: 71 (11 Nov 2019 05:32) (71 - 80)  BP: 112/64 (11 Nov 2019 05:32) (100/62 - 112/64)  BP(mean): --  RR: 16 (11 Nov 2019 05:32) (16 - 18)  SpO2: 100% (11 Nov 2019 05:32) (99% - 100%)    PHYSICAL EXAM:    General: AOx3, no acute distress  EYES: EOMI, PERRLA, conjunctiva and sclera clear  CHEST/LUNG: Clear to auscultation bilaterally; no wheezes, crackles or rales  HEART: Regular rate and rhythm; no murmurs  ABDOMEN: Soft, Nontender, Nondistended; BS+  LYMPH: No lymphadenopathy noted.  Extremities: No peripheral edema    LABS:                        8.5    9.97  )-----------( 407      ( 11 Nov 2019 05:21 )             26.3                     RADIOLOGY & ADDITIONAL STUDIES:    < from: CT Abdomen and Pelvis w/ Oral Cont and w/ IV Cont (11.04.19 @ 03:37) >    EXAM:  CT ABDOMEN AND PELVIS OC IC        PROCEDURE DATE:  Nov 4 2019         INTERPRETATION:  CLINICAL INFORMATION: Diffuse abdominal pain    COMPARISON: None.    PROCEDURE:   CT of the Abdomen and Pelvis was performed with intravenous contrast.   Intravenous contrast: 90 ml Omnipaque 350. 10 ml discarded.  Oral contrast: positive contrast was administered.  Sagittal and coronal reformats were performed.    FINDINGS:    LOWER CHEST: Within normal limits    LIVER: Hypodense foci measuring 5 mmin segment 6 (2:53) and 4 mm in   segment 7 (2:17) are too small to characterize by CT scan.  BILE DUCTS: Normal caliber.  GALLBLADDER: Within normal limits.  SPLEEN: Within normal limits.  PANCREAS: Within normal limits.  ADRENALS: Within normal limits.  KIDNEYS/URETERS: Within normal limits.    BLADDER: Within normal limits.  REPRODUCTIVE ORGANS: There are dilated thick-walled and hyperemic   fallopian tubes with fluid collections bilaterally compatible with   salpingitis and tubo-ovarian abscesses.    A partly myometrial partly subserosal fibroid  A myometrial fibroid with small submucosal component in the anterior body   of uterus measures 2.5 cm (601:71).  A myometrial fibroid in the anterior right lower uterine segment measures   approximately 2 cm (601:67).    BOWEL: No bowel obstruction. Appendix is normal.  PERITONEUM: There is complex free fluid in the pelvis with subtle   peritoneal enhancement in the cul-de-sac, indicating peritonitis.  There   is mild abdominal ascites in theparacolic bladders bilaterally, around   the tip of the spleen and in the left subdiaphragmatic space.  VESSELS: Large filling defect in the proximal (inferior) aspect of the   right ovarian vein is compatible with acute thrombosis.  RETROPERITONEUM/LYMPH NODES: No lymphadenopathy.    ABDOMINAL WALL: Within normal limits.  BONES: Within normal limits.    IMPRESSION:     Bilateral salpingitis and tubo-ovarian abscesses with evidence of   peritonitis.  Acute thrombosis of the inferior aspect of theright   ovarian vein, for which anticoagulation may be warranted.    CRITICAL VALUE: Dr. Ortiz from radiology discussed the major findings   in this report with Dr. Means on 11/04/2019 at 4:30 AM.  Critical   value policy of the hospital was followed. Read back and confirmation of   receipt of this communication was performed. This verbal communication   supplements the text report of this document.    < end of copied text >      PATHOLOGY:

## 2019-11-11 NOTE — PROGRESS NOTE ADULT - PROBLEM SELECTOR PLAN 1
Neuro: c/w current pain regimen  CV: Hemodynamically stable. Patient is anemic, H/H stable. c/w daily CBC and iron supplementation.  Pulm: Saturating well on RA. Increase incentive spirometry.  GI: Tolerating regular diet. Continue anti-emetics, zofran and simethicone PRN.  : Voiding spontaneously  Heme: Continue Lovenox for DVT ppx and ovarian vein thrombosis. Venodynes when in bed. Increase OOB. Will discuss requesting heme consult to evaluate if patient requires therapeutic vs prophylactic anticoagulation and duration of treatment.   ID: Currently afebrile, clinically improved, and leukocytosis resolved. Intraop cultures growing ESBL and patient transitioned to ertapenem per ID. Appreciate recommendations. Will continue--along with doxy--until 11/12. Transitioned to doxy to PO given equivalent bioavailability.  Endo: Continue levothyroxine for h/o hypothyroidism.  FEN: SLIV.   Dispo: c/w inpatient management for IV antibiotics.    Nishi Kline, PGY-1

## 2019-11-11 NOTE — CHART NOTE - NSCHARTNOTEFT_GEN_A_CORE
R1 PM Rounding Note    Patient seen at bedside. Feels well and denies any acute complaints. Denies further episodes of diarrhea after stool sample was collected this morning.    Vital Signs Last 24 Hrs  T(C): 37.3 (11 Nov 2019 14:54), Max: 37.3 (10 Nov 2019 18:56)  T(F): 99.1 (11 Nov 2019 14:54), Max: 99.1 (10 Nov 2019 18:56)  HR: 74 (11 Nov 2019 14:54) (71 - 77)  BP: 102/54 (11 Nov 2019 14:54) (100/58 - 112/64)  BP(mean): --  RR: 18 (11 Nov 2019 14:54) (16 - 18)  SpO2: 100% (11 Nov 2019 14:54) (99% - 100%)    PE:  - Gen: NAD, lying comfortably in bed  - Pulm: breathing comfortably  - CV: extremities well-perfused  - Abd: soft, appropriately tender    A/P: 50yo POD#5 HD#8 s/p LISA BSO for bilateral TOAs. Incidental R ovarian vein thrombosis seen on admission CT scan.  - Per heme recs, patient does not require anticoagulation on discharge. Can obtain TAUS upon return to [facility].  - Awaiting final ID recs for antibiotic regimen  - Continue current management    Nishi Kline, PGY-1

## 2019-11-11 NOTE — PROGRESS NOTE ADULT - SUBJECTIVE AND OBJECTIVE BOX
Gyn Progress Note POD#5 HD#8    Overnight events: Had one episode of loose stools after administration of PO doxy overnight.    Subjective:   Pt seen and examined at bedside. Pain well controlled. Patient ambulating. Passing flatus. Tolerating regular diet. Pt denies fever, chills, chest pain, SOB, nausea, vomiting, lightheadedness, dizziness.      Objective:  T(F): 98.1 (11-11-19 @ 05:32), Max: 99.1 (11-10-19 @ 18:56)  HR: 71 (11-11-19 @ 05:32) (71 - 80)  BP: 112/64 (11-11-19 @ 05:32) (100/55 - 112/64)  RR: 16 (11-11-19 @ 05:32) (16 - 18)  SpO2: 100% (11-11-19 @ 05:32) (99% - 100%)    I&O's Summary    09 Nov 2019 07:01  -  10 Nov 2019 07:00  --------------------------------------------------------  IN: 100 mL / OUT: 300 mL / NET: -200 mL    10 Nov 2019 07:01  -  11 Nov 2019 06:39  --------------------------------------------------------  IN: 50 mL / OUT: 700 mL / NET: -650 mL        MEDICATIONS  (STANDING):  acetaminophen   Tablet .. 975 milliGRAM(s) Oral every 6 hours  ascorbic acid 500 milliGRAM(s) Oral three times a day  doxycycline hyclate Capsule 100 milliGRAM(s) Oral every 12 hours  enoxaparin Injectable 40 milliGRAM(s) SubCutaneous daily  ertapenem  IVPB 1000 milliGRAM(s) IV Intermittent every 24 hours  ertapenem  IVPB      famotidine    Tablet 20 milliGRAM(s) Oral daily  ferrous    sulfate 325 milliGRAM(s) Oral three times a day  ibuprofen  Tablet. 600 milliGRAM(s) Oral every 6 hours  levothyroxine 100 MICROGram(s) Oral daily  metoclopramide 10 milliGRAM(s) Oral every 8 hours  ondansetron    Tablet 8 milliGRAM(s) Oral every 8 hours    MEDICATIONS  (PRN):  oxyCODONE    IR 5 milliGRAM(s) Oral every 4 hours PRN Moderate Pain (4 - 6)  oxyCODONE    IR 10 milliGRAM(s) Oral every 6 hours PRN Severe Pain (7 - 10)  senna 2 Tablet(s) Oral at bedtime PRN Constipation  simethicone 80 milliGRAM(s) Chew every 8 hours PRN Gas      Physical Exam:  Constitutional: NAD, A+O x3  CV: RRR  Lungs: clear to auscultation bilaterally  Abdomen: soft, nondistended, no guarding, no rebound, normal bowel sounds  Incision: clean, dry, intact  Extremities: no lower extremity edema or calf tenderness bilaterally; venodynes in place    LABS:                      8.5    9.97  )-----------( 407      ( 11 Nov 2019 05:21 )             26.3

## 2019-11-11 NOTE — PROGRESS NOTE ADULT - ASSESSMENT
Assessment/Plan: 48yo HD#8 POD#5 s/p Total Abdominal Hysterectomy, Bilateral Salpingo-oophorectomy, abdominal washout for bilateral TOAs. On admission, patient was found to have R ovarian vein thrombosis with no evidence of PE. Patient is significantly clinically improved. Abdominal cultures are growing ESBL, antibiotic regimen per ID.

## 2019-11-12 PROBLEM — Z00.00 ENCOUNTER FOR PREVENTIVE HEALTH EXAMINATION: Status: ACTIVE | Noted: 2019-11-12

## 2019-11-12 PROBLEM — E03.9 HYPOTHYROIDISM, UNSPECIFIED: Chronic | Status: ACTIVE | Noted: 2019-11-04

## 2019-11-12 LAB
BASOPHILS # BLD AUTO: 0.05 K/UL — SIGNIFICANT CHANGE UP (ref 0–0.2)
BASOPHILS NFR BLD AUTO: 0.5 % — SIGNIFICANT CHANGE UP (ref 0–2)
EOSINOPHIL # BLD AUTO: 0.22 K/UL — SIGNIFICANT CHANGE UP (ref 0–0.5)
EOSINOPHIL NFR BLD AUTO: 2.3 % — SIGNIFICANT CHANGE UP (ref 0–6)
HCT VFR BLD CALC: 25.9 % — LOW (ref 34.5–45)
HGB BLD-MCNC: 8.4 G/DL — LOW (ref 11.5–15.5)
IMM GRANULOCYTES NFR BLD AUTO: 1.7 % — HIGH (ref 0–1.5)
LYMPHOCYTES # BLD AUTO: 1.99 K/UL — SIGNIFICANT CHANGE UP (ref 1–3.3)
LYMPHOCYTES # BLD AUTO: 20.6 % — SIGNIFICANT CHANGE UP (ref 13–44)
MCHC RBC-ENTMCNC: 27 PG — SIGNIFICANT CHANGE UP (ref 27–34)
MCHC RBC-ENTMCNC: 32.4 % — SIGNIFICANT CHANGE UP (ref 32–36)
MCV RBC AUTO: 83.3 FL — SIGNIFICANT CHANGE UP (ref 80–100)
MONOCYTES # BLD AUTO: 0.82 K/UL — SIGNIFICANT CHANGE UP (ref 0–0.9)
MONOCYTES NFR BLD AUTO: 8.5 % — SIGNIFICANT CHANGE UP (ref 2–14)
NEUTROPHILS # BLD AUTO: 6.41 K/UL — SIGNIFICANT CHANGE UP (ref 1.8–7.4)
NEUTROPHILS NFR BLD AUTO: 66.4 % — SIGNIFICANT CHANGE UP (ref 43–77)
NRBC # FLD: 0 K/UL — SIGNIFICANT CHANGE UP (ref 0–0)
PLATELET # BLD AUTO: 407 K/UL — HIGH (ref 150–400)
PMV BLD: 10 FL — SIGNIFICANT CHANGE UP (ref 7–13)
RBC # BLD: 3.11 M/UL — LOW (ref 3.8–5.2)
RBC # FLD: 18.3 % — HIGH (ref 10.3–14.5)
WBC # BLD: 9.65 K/UL — SIGNIFICANT CHANGE UP (ref 3.8–10.5)
WBC # FLD AUTO: 9.65 K/UL — SIGNIFICANT CHANGE UP (ref 3.8–10.5)

## 2019-11-12 PROCEDURE — 99232 SBSQ HOSP IP/OBS MODERATE 35: CPT

## 2019-11-12 RX ADMIN — Medication 325 MILLIGRAM(S): at 11:06

## 2019-11-12 RX ADMIN — Medication 975 MILLIGRAM(S): at 06:36

## 2019-11-12 RX ADMIN — Medication 325 MILLIGRAM(S): at 05:49

## 2019-11-12 RX ADMIN — Medication 975 MILLIGRAM(S): at 23:19

## 2019-11-12 RX ADMIN — Medication 600 MILLIGRAM(S): at 05:15

## 2019-11-12 RX ADMIN — Medication 1 TABLET(S): at 11:05

## 2019-11-12 RX ADMIN — Medication 325 MILLIGRAM(S): at 23:05

## 2019-11-12 RX ADMIN — Medication 600 MILLIGRAM(S): at 04:32

## 2019-11-12 RX ADMIN — Medication 975 MILLIGRAM(S): at 05:49

## 2019-11-12 RX ADMIN — Medication 500 MILLIGRAM(S): at 11:06

## 2019-11-12 RX ADMIN — Medication 500 MILLIGRAM(S): at 05:49

## 2019-11-12 RX ADMIN — Medication 500 MILLIGRAM(S): at 23:05

## 2019-11-12 RX ADMIN — ERTAPENEM SODIUM 120 MILLIGRAM(S): 1 INJECTION, POWDER, LYOPHILIZED, FOR SOLUTION INTRAMUSCULAR; INTRAVENOUS at 05:49

## 2019-11-12 RX ADMIN — Medication 100 MILLIGRAM(S): at 05:49

## 2019-11-12 RX ADMIN — Medication 100 MICROGRAM(S): at 05:49

## 2019-11-12 RX ADMIN — FAMOTIDINE 20 MILLIGRAM(S): 10 INJECTION INTRAVENOUS at 11:05

## 2019-11-12 RX ADMIN — Medication 100 MILLIGRAM(S): at 18:19

## 2019-11-12 RX ADMIN — Medication 975 MILLIGRAM(S): at 23:06

## 2019-11-12 RX ADMIN — ENOXAPARIN SODIUM 40 MILLIGRAM(S): 100 INJECTION SUBCUTANEOUS at 11:05

## 2019-11-12 RX ADMIN — Medication 975 MILLIGRAM(S): at 00:53

## 2019-11-12 NOTE — PROGRESS NOTE ADULT - SUBJECTIVE AND OBJECTIVE BOX
Gyn Progress Note POD#6 HD#9    Subjective:   Pt seen and examined at bedside. No events overnight. Pain well controlled. Patient ambulating. Passing flatus and having BMs. Tolerating regular diet. Nausea from doxycycline much improved when given with food. Pt denies fever, chills, chest pain, SOB, nausea, vomiting, lightheadedness, dizziness.      Objective:  T(F): 98.6 (11-12-19 @ 05:46), Max: 99.1 (11-11-19 @ 14:54)  HR: 74 (11-12-19 @ 05:46) (74 - 79)  BP: 108/62 (11-12-19 @ 05:46) (100/58 - 114/56)  RR: 16 (11-12-19 @ 05:46) (16 - 18)  SpO2: 99% (11-12-19 @ 05:46) (99% - 100%)    I&O's Summary    11 Nov 2019 07:01  -  12 Nov 2019 07:00  --------------------------------------------------------  IN: 530 mL / OUT: 3150 mL / NET: -2620 mL    MEDICATIONS  (STANDING):  acetaminophen   Tablet .. 975 milliGRAM(s) Oral every 6 hours  ascorbic acid 500 milliGRAM(s) Oral three times a day  doxycycline hyclate Capsule 100 milliGRAM(s) Oral every 12 hours  enoxaparin Injectable 40 milliGRAM(s) SubCutaneous daily  ertapenem  IVPB 1000 milliGRAM(s) IV Intermittent every 24 hours  ertapenem  IVPB      famotidine    Tablet 20 milliGRAM(s) Oral daily  ferrous    sulfate 325 milliGRAM(s) Oral three times a day  ibuprofen  Tablet. 600 milliGRAM(s) Oral every 6 hours  lactobacillus acidophilus 1 Tablet(s) Oral daily  levothyroxine 100 MICROGram(s) Oral daily  metoclopramide 10 milliGRAM(s) Oral every 8 hours  ondansetron    Tablet 8 milliGRAM(s) Oral every 8 hours    MEDICATIONS  (PRN):  oxyCODONE    IR 5 milliGRAM(s) Oral every 4 hours PRN Moderate Pain (4 - 6)  oxyCODONE    IR 10 milliGRAM(s) Oral every 6 hours PRN Severe Pain (7 - 10)  senna 2 Tablet(s) Oral at bedtime PRN Constipation  simethicone 80 milliGRAM(s) Chew every 8 hours PRN Gas      Physical Exam:  Constitutional: NAD, A+O x3  CV: RRR  Lungs: clear to auscultation bilaterally  Abdomen: soft, nondistended, appropriately tender, no guarding, no rebound, normal bowel sounds  Incision: clean, dry, intact  Extremities: no lower extremity edema or calf tenderness bilaterally; venodynes in place    LABS:                        8.4    9.65  )-----------( 407      ( 12 Nov 2019 05:24 )             25.9

## 2019-11-12 NOTE — PROGRESS NOTE ADULT - PROBLEM SELECTOR PLAN 1
Neuro: c/w current pain regimen  CV: Hemodynamically stable. Patient is anemic, H/H stable. c/w daily CBC and iron supplementation.  Pulm: Saturating well on RA. Increase incentive spirometry.  GI: Tolerating regular diet. Continue anti-emetics, zofran and simethicone PRN.  : Voiding spontaneously  Heme: Continue Lovenox for DVT ppx and ovarian vein thrombosis. Per heme recs, patient does not require outpatient anticoagulation for OVT and can undergo transabdominal ultrasound in [facility] with her PCP to evaluate for resolution of thrombosis. Venodynes when in bed. Increase OOB.   ID: Currently afebrile, clinically improved, and leukocytosis resolved. Intraop cultures growing ESBL and patient transitioned to ertapenem on 11/9 and continued on doxy per ID. Last dose of IV ertapenem today. Appreciate final recs for outpatient antibiotic regimen.   Endo: Continue levothyroxine for h/o hypothyroidism.  FEN: SLIV.   Dispo: discharge planning    Nishi Kline, PGY-1.

## 2019-11-12 NOTE — PROGRESS NOTE ADULT - ASSESSMENT
50 yo woman presenting with bilateral tubo-ovarian abscesses with peritonitis, ovarian vein thrombosis.  s/p LISA/ BSO 11/6.  Improved.  Leucocytosis resolved.  OR pelvic culture ESBL E coli.  Blood culture negative.    Clinically improved. day # 9 antibiotics.      doxy 11/4-  flagyl 11/4- 11/9  ceftriaxone 11/4-  cefotetan 11/4- 11/9  ertapenem 11/9-    Suggest:    complete ertapenem 1 gm iv q 24 h -> 11/12  doxycycline 100 mg po BID --> 11/17        Jackie Salomon MD  Pager: [phone]  After 5 PM or weekends please call fellow on call or office [phone]

## 2019-11-12 NOTE — PROGRESS NOTE ADULT - ASSESSMENT
Assessment/Plan: 49y female POD#6 HD#9 s/p Total Abdominal Hysterectomy, Bilateral Salpingo-oophorectomy, abdominal washout for bilateral TOAs. On admission, patient was found to have R ovarian vein thrombosis with no evidence of PE. Patient is significantly clinically improved. Abdominal cultures are growing ESBL, antibiotic regimen per ID.

## 2019-11-12 NOTE — PROGRESS NOTE ADULT - SUBJECTIVE AND OBJECTIVE BOX
Follow Up:  TOA, s/p LISA, BSO    Inverval History/ROS: feels OK.  less pain wound.  no fever, chills.    Allergies  No Known Allergies        ANTIMICROBIALS:  doxycycline hyclate Capsule 100 every 12 hours  ertapenem  IVPB 1000 every 24 hours  ertapenem  IVPB        OTHER MEDS:  acetaminophen   Tablet .. 975 milliGRAM(s) Oral every 6 hours  ascorbic acid 500 milliGRAM(s) Oral three times a day  enoxaparin Injectable 40 milliGRAM(s) SubCutaneous daily  famotidine    Tablet 20 milliGRAM(s) Oral daily  ferrous    sulfate 325 milliGRAM(s) Oral three times a day  ibuprofen  Tablet. 600 milliGRAM(s) Oral every 6 hours  lactobacillus acidophilus 1 Tablet(s) Oral daily  levothyroxine 100 MICROGram(s) Oral daily  metoclopramide 10 milliGRAM(s) Oral every 8 hours  ondansetron    Tablet 8 milliGRAM(s) Oral every 8 hours  oxyCODONE    IR 5 milliGRAM(s) Oral every 4 hours PRN  oxyCODONE    IR 10 milliGRAM(s) Oral every 6 hours PRN  senna 2 Tablet(s) Oral at bedtime PRN  simethicone 80 milliGRAM(s) Chew every 8 hours PRN      Vital Signs Last 24 Hrs  T(C): 37 (12 Nov 2019 05:46), Max: 37.1 (11 Nov 2019 17:33)  T(F): 98.6 (12 Nov 2019 05:46), Max: 98.8 (11 Nov 2019 22:25)  HR: 74 (12 Nov 2019 05:46) (74 - 79)  BP: 108/62 (12 Nov 2019 05:46) (103/55 - 114/56)  BP(mean): --  RR: 16 (12 Nov 2019 05:46) (16 - 17)  SpO2: 99% (12 Nov 2019 05:46) (99% - 100%)    PHYSICAL EXAM:  General:  non-toxic  HEAD/EYES:  white sclera   ENT:   supple   Cardiovascular: S1S2  Respiratory:   clear to ausculation bilaterally  GI:   soft, slight distended,  bowel sounds+, mid line wound no erythema  :  no quezada    Musculoskeletal:   no synovitis  Neurologic:  non-focal exam   Skin:   no rash  Lymph:  no lymphadenopathy  Psychiatric:  [x ] appropriate affect [x ] alert & oriented  Lines:  [ ] no phlebitis [ ] central line                                8.4    9.65  )-----------( 407      ( 12 Nov 2019 05:24 )             25.9                   MICROBIOLOGY:    PELVIS  11-06-19 --  --  E.COLI ESBL POSITIVE      BLOOD PERIPHERAL  11-04-19 --  --  --      URINE MIDSTREAM  11-04-19 --  --  --      RADIOLOGY:    < from: CT Abdomen and Pelvis w/ Oral Cont and w/ IV Cont (11.04.19 @ 03:37) >  BOWEL: No bowel obstruction. Appendix is normal.  PERITONEUM: There is complex free fluid in the pelvis with subtle   peritoneal enhancement in the cul-de-sac, indicating peritonitis.  There   is mild abdominal ascites in theparacolic bladders bilaterally, around   the tip of the spleen and in the left subdiaphragmatic space.  VESSELS: Large filling defect in the proximal (inferior) aspect of the   right ovarian vein is compatible with acute thrombosis.  RETROPERITONEUM/LYMPH NODES: No lymphadenopathy.    ABDOMINAL WALL: Within normal limits.  BONES: Within normal limits.    IMPRESSION:     Bilateral salpingitis and tubo-ovarian abscesses with evidence of   peritonitis.  Acute thrombosis of the inferior aspect of theright   ovarian vein, for which anticoagulation may be warranted.    < end of copied text >

## 2019-11-13 LAB
APPEARANCE UR: CLEAR — SIGNIFICANT CHANGE UP
BACTERIA # UR AUTO: SIGNIFICANT CHANGE UP
BASOPHILS # BLD AUTO: 0.05 K/UL — SIGNIFICANT CHANGE UP (ref 0–0.2)
BASOPHILS NFR BLD AUTO: 0.6 % — SIGNIFICANT CHANGE UP (ref 0–2)
BILIRUB UR-MCNC: NEGATIVE — SIGNIFICANT CHANGE UP
BLOOD UR QL VISUAL: NEGATIVE — SIGNIFICANT CHANGE UP
COLOR SPEC: COLORLESS — SIGNIFICANT CHANGE UP
EOSINOPHIL # BLD AUTO: 0.15 K/UL — SIGNIFICANT CHANGE UP (ref 0–0.5)
EOSINOPHIL NFR BLD AUTO: 1.9 % — SIGNIFICANT CHANGE UP (ref 0–6)
GLUCOSE UR-MCNC: NEGATIVE — SIGNIFICANT CHANGE UP
HCT VFR BLD CALC: 28.1 % — LOW (ref 34.5–45)
HGB BLD-MCNC: 9.4 G/DL — LOW (ref 11.5–15.5)
HYALINE CASTS # UR AUTO: NEGATIVE — SIGNIFICANT CHANGE UP
IMM GRANULOCYTES NFR BLD AUTO: 1.9 % — HIGH (ref 0–1.5)
KETONES UR-MCNC: NEGATIVE — SIGNIFICANT CHANGE UP
LEUKOCYTE ESTERASE UR-ACNC: SIGNIFICANT CHANGE UP
LYMPHOCYTES # BLD AUTO: 1.66 K/UL — SIGNIFICANT CHANGE UP (ref 1–3.3)
LYMPHOCYTES # BLD AUTO: 21.1 % — SIGNIFICANT CHANGE UP (ref 13–44)
MCHC RBC-ENTMCNC: 27.8 PG — SIGNIFICANT CHANGE UP (ref 27–34)
MCHC RBC-ENTMCNC: 33.5 % — SIGNIFICANT CHANGE UP (ref 32–36)
MCV RBC AUTO: 83.1 FL — SIGNIFICANT CHANGE UP (ref 80–100)
MONOCYTES # BLD AUTO: 0.56 K/UL — SIGNIFICANT CHANGE UP (ref 0–0.9)
MONOCYTES NFR BLD AUTO: 7.1 % — SIGNIFICANT CHANGE UP (ref 2–14)
NEUTROPHILS # BLD AUTO: 5.29 K/UL — SIGNIFICANT CHANGE UP (ref 1.8–7.4)
NEUTROPHILS NFR BLD AUTO: 67.4 % — SIGNIFICANT CHANGE UP (ref 43–77)
NITRITE UR-MCNC: NEGATIVE — SIGNIFICANT CHANGE UP
NRBC # FLD: 0 K/UL — SIGNIFICANT CHANGE UP (ref 0–0)
PH UR: 6.5 — SIGNIFICANT CHANGE UP (ref 5–8)
PLATELET # BLD AUTO: 483 K/UL — HIGH (ref 150–400)
PMV BLD: 9.4 FL — SIGNIFICANT CHANGE UP (ref 7–13)
PROT UR-MCNC: NEGATIVE — SIGNIFICANT CHANGE UP
RBC # BLD: 3.38 M/UL — LOW (ref 3.8–5.2)
RBC # FLD: 18.4 % — HIGH (ref 10.3–14.5)
RBC CASTS # UR COMP ASSIST: SIGNIFICANT CHANGE UP (ref 0–?)
SP GR SPEC: 1.01 — SIGNIFICANT CHANGE UP (ref 1–1.04)
SQUAMOUS # UR AUTO: SIGNIFICANT CHANGE UP
SURGICAL PATHOLOGY STUDY: SIGNIFICANT CHANGE UP
UROBILINOGEN FLD QL: NORMAL — SIGNIFICANT CHANGE UP
WBC # BLD: 7.86 K/UL — SIGNIFICANT CHANGE UP (ref 3.8–10.5)
WBC # FLD AUTO: 7.86 K/UL — SIGNIFICANT CHANGE UP (ref 3.8–10.5)
WBC UR QL: HIGH (ref 0–?)

## 2019-11-13 PROCEDURE — 99232 SBSQ HOSP IP/OBS MODERATE 35: CPT

## 2019-11-13 RX ADMIN — Medication 10 MILLIGRAM(S): at 19:57

## 2019-11-13 RX ADMIN — FAMOTIDINE 20 MILLIGRAM(S): 10 INJECTION INTRAVENOUS at 12:40

## 2019-11-13 RX ADMIN — Medication 100 MILLIGRAM(S): at 05:22

## 2019-11-13 RX ADMIN — Medication 325 MILLIGRAM(S): at 05:21

## 2019-11-13 RX ADMIN — Medication 1 TABLET(S): at 12:40

## 2019-11-13 RX ADMIN — Medication 325 MILLIGRAM(S): at 20:56

## 2019-11-13 RX ADMIN — Medication 975 MILLIGRAM(S): at 05:22

## 2019-11-13 RX ADMIN — Medication 975 MILLIGRAM(S): at 06:27

## 2019-11-13 RX ADMIN — Medication 100 MILLIGRAM(S): at 19:57

## 2019-11-13 RX ADMIN — ONDANSETRON 8 MILLIGRAM(S): 8 TABLET, FILM COATED ORAL at 12:42

## 2019-11-13 RX ADMIN — Medication 975 MILLIGRAM(S): at 20:26

## 2019-11-13 RX ADMIN — Medication 325 MILLIGRAM(S): at 12:41

## 2019-11-13 RX ADMIN — Medication 500 MILLIGRAM(S): at 12:44

## 2019-11-13 RX ADMIN — Medication 10 MILLIGRAM(S): at 08:50

## 2019-11-13 RX ADMIN — ERTAPENEM SODIUM 120 MILLIGRAM(S): 1 INJECTION, POWDER, LYOPHILIZED, FOR SOLUTION INTRAMUSCULAR; INTRAVENOUS at 06:39

## 2019-11-13 RX ADMIN — Medication 500 MILLIGRAM(S): at 05:22

## 2019-11-13 RX ADMIN — ENOXAPARIN SODIUM 40 MILLIGRAM(S): 100 INJECTION SUBCUTANEOUS at 12:40

## 2019-11-13 RX ADMIN — Medication 975 MILLIGRAM(S): at 19:56

## 2019-11-13 RX ADMIN — Medication 100 MICROGRAM(S): at 05:22

## 2019-11-13 RX ADMIN — Medication 500 MILLIGRAM(S): at 20:56

## 2019-11-13 NOTE — PROGRESS NOTE ADULT - SUBJECTIVE AND OBJECTIVE BOX
Gyn Progress Note POD#7 HD#10    Subjective:   Pt seen and examined at bedside. No events overnight. Pain well controlled though endorses dysuria starting overnight. Patient ambulating. Passing flatus. Tolerating regular diet. Pt denies fever, chills, chest pain, SOB, nausea, vomiting, lightheadedness, dizziness.      Objective:  T(F): 98.3 (11-13-19 @ 05:19), Max: 98.7 (11-12-19 @ 17:35)  HR: 76 (11-13-19 @ 05:19) (74 - 78)  BP: 100/56 (11-13-19 @ 05:19) (100/56 - 112/56)  RR: 18 (11-13-19 @ 05:19) (16 - 18)  SpO2: 99% (11-13-19 @ 05:19) (98% - 100%)    I&O's Summary    11 Nov 2019 07:01  -  12 Nov 2019 07:00  --------------------------------------------------------  IN: 530 mL / OUT: 3150 mL / NET: -2620 mL    12 Nov 2019 07:01  -  13 Nov 2019 06:30  --------------------------------------------------------  IN: 600 mL / OUT: 1000 mL / NET: -400 mL        MEDICATIONS  (STANDING):  acetaminophen   Tablet .. 975 milliGRAM(s) Oral every 6 hours  ascorbic acid 500 milliGRAM(s) Oral three times a day  doxycycline hyclate Capsule 100 milliGRAM(s) Oral every 12 hours  enoxaparin Injectable 40 milliGRAM(s) SubCutaneous daily  ertapenem  IVPB 1000 milliGRAM(s) IV Intermittent every 24 hours  ertapenem  IVPB      famotidine    Tablet 20 milliGRAM(s) Oral daily  ferrous    sulfate 325 milliGRAM(s) Oral three times a day  ibuprofen  Tablet. 600 milliGRAM(s) Oral every 6 hours  lactobacillus acidophilus 1 Tablet(s) Oral daily  levothyroxine 100 MICROGram(s) Oral daily  metoclopramide 10 milliGRAM(s) Oral every 8 hours  ondansetron    Tablet 8 milliGRAM(s) Oral every 8 hours    MEDICATIONS  (PRN):  oxyCODONE    IR 5 milliGRAM(s) Oral every 4 hours PRN Moderate Pain (4 - 6)  oxyCODONE    IR 10 milliGRAM(s) Oral every 6 hours PRN Severe Pain (7 - 10)  senna 2 Tablet(s) Oral at bedtime PRN Constipation  simethicone 80 milliGRAM(s) Chew every 8 hours PRN Gas      Physical Exam:  Constitutional: NAD, A+O x3  CV: RRR  Lungs: clear to auscultation bilaterally  Abdomen: soft, nondistended, no guarding, no rebound, normal bowel sounds  Incision: clean, dry, intact  Extremities: no lower extremity edema or calf tenderness bilaterally; venodynes in place    LABS:                        9.4    7.86  )-----------( 483      ( 13 Nov 2019 05:37 )             28.1

## 2019-11-13 NOTE — DIETITIAN INITIAL EVALUATION ADULT. - PERTINENT MEDS FT
acetaminophen   Tablet ..  ascorbic acid  doxycycline hyclate Capsule  enoxaparin Injectable  famotidine    Tablet  ferrous    sulfate  ibuprofen  Tablet.  lactobacillus acidophilus  levothyroxine  metoclopramide  ondansetron    Tablet  oxyCODONE    IR PRN  oxyCODONE    IR PRN  senna PRN  simethicone PRN

## 2019-11-13 NOTE — DIETITIAN INITIAL EVALUATION ADULT. - OTHER INFO
Per chart review patient with medical history of hypothyroidism presenting with abdominal pain, fevers for one week, and dysuria. Patient [place] speaking, utilized  service to conduct interview,  Jarod ID#[id number]. S/p LISA, BSO and b/l ureterolysis (11/06). Patient reports good appetite/PO intake prior to admission. NKFA. Takes Vitamin C and vitamin E supplements at home for dermatologic reasons. Reports having low PO intake at the beginning of admission due to lack of appetite. Reports her PO intake/appetite has improved since yesterday. She feels hungry and wants to eat. Tolerating meals. Previously with loose stools - receiving probiotic. No GI distress (nausea/vomiting/diarrhea/constipation) noted at this time.  Patient denies any changes in weight prior to admission. Reports UBW ranges from 65-67kg and she works to maintain weight due to thyroid condition. Encouraged PO intake and consuming High Biological Value Protein sources (i.e. chicken, turkey, beef, fish, eggs, etc.). Provided diet education regarding protein rich foods.

## 2019-11-13 NOTE — PROGRESS NOTE ADULT - SUBJECTIVE AND OBJECTIVE BOX
Follow Up:  TODONOVAN, s/p LISA, BSO    Inverval History/ROS: feels OK. sitting in chair.   less abd wound.   no fever, chills.    Allergies  No Known Allergies        ANTIMICROBIALS:  doxycycline hyclate Capsule 100 every 12 hours  ertapenem 11/9-      OTHER MEDS:  acetaminophen   Tablet .. 975 milliGRAM(s) Oral every 6 hours  ascorbic acid 500 milliGRAM(s) Oral three times a day  enoxaparin Injectable 40 milliGRAM(s) SubCutaneous daily  famotidine    Tablet 20 milliGRAM(s) Oral daily  ferrous    sulfate 325 milliGRAM(s) Oral three times a day  ibuprofen  Tablet. 600 milliGRAM(s) Oral every 6 hours  lactobacillus acidophilus 1 Tablet(s) Oral daily  levothyroxine 100 MICROGram(s) Oral daily  metoclopramide 10 milliGRAM(s) Oral every 8 hours  ondansetron    Tablet 8 milliGRAM(s) Oral every 8 hours  oxyCODONE    IR 5 milliGRAM(s) Oral every 4 hours PRN  oxyCODONE    IR 10 milliGRAM(s) Oral every 6 hours PRN  senna 2 Tablet(s) Oral at bedtime PRN  simethicone 80 milliGRAM(s) Chew every 8 hours PRN    Vital Signs Last 24 Hrs  T(F): 98 (11-13-19 @ 12:48), Max: 98.7 (11-12-19 @ 17:35)  HR: 80 (11-13-19 @ 12:48)  BP: 102/59 (11-13-19 @ 12:48)  RR: 16 (11-13-19 @ 12:48)  SpO2: 100% (11-13-19 @ 12:48) (98% - 100%)    PHYSICAL EXAM:  General:  non-toxic  HEAD/EYES:  white sclera   ENT:   supple   Cardiovascular: S1S2  Respiratory:   clear to ausculation bilaterally  GI:   soft, slight distended,  bowel sounds+, mid line wound no erythema  :  no quezada    Musculoskeletal:   no synovitis  Neurologic:  non-focal exam   Skin:   no rash  Lymph:  no lymphadenopathy  Psychiatric:  [x ] appropriate affect [x ] alert & oriented  Lines:  [x ] no phlebitis right hand iv                                           9.4    7.86  )-----------( 483      ( 13 Nov 2019 05:37 )             28.1                       MICROBIOLOGY:    PELVIS  11-06-19 --  --  E.COLI ESBL POSITIVE      BLOOD PERIPHERAL  11-04-19 --  --  --      URINE MIDSTREAM  11-04-19 --  --  --      RADIOLOGY:    < from: CT Abdomen and Pelvis w/ Oral Cont and w/ IV Cont (11.04.19 @ 03:37) >  BOWEL: No bowel obstruction. Appendix is normal.  PERITONEUM: There is complex free fluid in the pelvis with subtle   peritoneal enhancement in the cul-de-sac, indicating peritonitis.  There   is mild abdominal ascites in theparacolic bladders bilaterally, around   the tip of the spleen and in the left subdiaphragmatic space.  VESSELS: Large filling defect in the proximal (inferior) aspect of the   right ovarian vein is compatible with acute thrombosis.  RETROPERITONEUM/LYMPH NODES: No lymphadenopathy.    ABDOMINAL WALL: Within normal limits.  BONES: Within normal limits.    IMPRESSION:     Bilateral salpingitis and tubo-ovarian abscesses with evidence of   peritonitis.  Acute thrombosis of the inferior aspect of theright   ovarian vein, for which anticoagulation may be warranted.    < end of copied text >

## 2019-11-13 NOTE — PROGRESS NOTE ADULT - ASSESSMENT
Assessment/Plan: 49y female POD#7 HD#10 s/p Total Abdominal Hysterectomy, Bilateral Salpingo-oophorectomy, abdominal washout for bilateral TOAs. On admission, patient was found to have R ovarian vein thrombosis with no evidence of PE. Patient is significantly clinically improved. Abdominal cultures are growing ESBL, antibiotic regimen per ID.

## 2019-11-13 NOTE — DIETITIAN INITIAL EVALUATION ADULT. - PERTINENT LABORATORY DATA
11-13 Na n/a   Glu n/a   K+ n/a   Cr n/a   BUN n/a   Phos n/a   Alb n/a   PAB n/a   Hgb 9.4 g/dL<L> Hct 28.1 %<L> HgA1C n/a

## 2019-11-13 NOTE — PROGRESS NOTE ADULT - ASSESSMENT
50 yo woman presenting with bilateral tubo-ovarian abscesses with peritonitis, ovarian vein thrombosis.  s/p LISA/ BSO 11/6.  Improved.  Leucocytosis resolved.  OR pelvic culture ESBL E coli.  Blood culture negative.    OR findings reviewed.  would extend duration of ertapenem total 7 days.    today day # 5/7.      doxy 11/4-  flagyl 11/4- 11/9  ceftriaxone 11/4-  cefotetan 11/4- 11/9  ertapenem 11/9-    Suggest:    complete ertapenem 1 gm iv q 24 h -> 11/15 (complete after 11/15 AM dose)  doxycycline 100 mg po BID --> 11/17        Jackie Salomon MD  Pager: [phone]  After 5 PM or weekends please call fellow on call or office [phone]

## 2019-11-13 NOTE — PROGRESS NOTE ADULT - PROBLEM SELECTOR PLAN 1
Neuro: c/w current pain regimen  CV: Hemodynamically stable. Patient is anemic, H/H stable. c/w daily CBC and iron supplementation.  Pulm: Saturating well on RA. Increase incentive spirometry.  GI: Tolerating regular diet. Continue anti-emetics, zofran and simethicone PRN.  : Voiding spontaneously  Heme: Continue Lovenox for DVT ppx and ovarian vein thrombosis. Per heme recs, patient does not require outpatient anticoagulation for OVT and can undergo transabdominal ultrasound in [facility] with her PCP to evaluate for resolution of thrombosis. Venodynes when in bed. Increase OOB.   ID: Currently afebrile, clinically improved, and leukocytosis resolved. Intraop cultures growing ESBL and patient transitioned to ertapenem on 11/9 and continued on doxy per ID. As per ID will continue with several more doses of ertapenem. Appreciate final recs for outpatient antibiotic regimen.   Endo: Continue levothyroxine for h/o hypothyroidism.  FEN: SLIV.   Dispo: discharge planning    Nishi Kline, PGY-1.

## 2019-11-14 LAB
BASOPHILS # BLD AUTO: 0.06 K/UL — SIGNIFICANT CHANGE UP (ref 0–0.2)
BASOPHILS NFR BLD AUTO: 0.8 % — SIGNIFICANT CHANGE UP (ref 0–2)
EOSINOPHIL # BLD AUTO: 0.14 K/UL — SIGNIFICANT CHANGE UP (ref 0–0.5)
EOSINOPHIL NFR BLD AUTO: 1.9 % — SIGNIFICANT CHANGE UP (ref 0–6)
HCT VFR BLD CALC: 26.4 % — LOW (ref 34.5–45)
HGB BLD-MCNC: 8.8 G/DL — LOW (ref 11.5–15.5)
IMM GRANULOCYTES NFR BLD AUTO: 2 % — HIGH (ref 0–1.5)
LYMPHOCYTES # BLD AUTO: 1.62 K/UL — SIGNIFICANT CHANGE UP (ref 1–3.3)
LYMPHOCYTES # BLD AUTO: 21.5 % — SIGNIFICANT CHANGE UP (ref 13–44)
MCHC RBC-ENTMCNC: 27.9 PG — SIGNIFICANT CHANGE UP (ref 27–34)
MCHC RBC-ENTMCNC: 33.3 % — SIGNIFICANT CHANGE UP (ref 32–36)
MCV RBC AUTO: 83.8 FL — SIGNIFICANT CHANGE UP (ref 80–100)
MONOCYTES # BLD AUTO: 0.63 K/UL — SIGNIFICANT CHANGE UP (ref 0–0.9)
MONOCYTES NFR BLD AUTO: 8.3 % — SIGNIFICANT CHANGE UP (ref 2–14)
NEUTROPHILS # BLD AUTO: 4.95 K/UL — SIGNIFICANT CHANGE UP (ref 1.8–7.4)
NEUTROPHILS NFR BLD AUTO: 65.5 % — SIGNIFICANT CHANGE UP (ref 43–77)
NRBC # FLD: 0 K/UL — SIGNIFICANT CHANGE UP (ref 0–0)
PLATELET # BLD AUTO: 474 K/UL — HIGH (ref 150–400)
PMV BLD: 9.6 FL — SIGNIFICANT CHANGE UP (ref 7–13)
RBC # BLD: 3.15 M/UL — LOW (ref 3.8–5.2)
RBC # FLD: 19 % — HIGH (ref 10.3–14.5)
WBC # BLD: 7.55 K/UL — SIGNIFICANT CHANGE UP (ref 3.8–10.5)
WBC # FLD AUTO: 7.55 K/UL — SIGNIFICANT CHANGE UP (ref 3.8–10.5)

## 2019-11-14 RX ORDER — POLYETHYLENE GLYCOL 3350 17 G/17G
17 POWDER, FOR SOLUTION ORAL DAILY
Refills: 0 | Status: DISCONTINUED | OUTPATIENT
Start: 2019-11-14 | End: 2019-11-15

## 2019-11-14 RX ADMIN — Medication 1 TABLET(S): at 13:25

## 2019-11-14 RX ADMIN — Medication 100 MILLIGRAM(S): at 06:06

## 2019-11-14 RX ADMIN — Medication 100 MILLIGRAM(S): at 17:26

## 2019-11-14 RX ADMIN — Medication 325 MILLIGRAM(S): at 21:34

## 2019-11-14 RX ADMIN — ENOXAPARIN SODIUM 40 MILLIGRAM(S): 100 INJECTION SUBCUTANEOUS at 13:25

## 2019-11-14 RX ADMIN — Medication 975 MILLIGRAM(S): at 06:30

## 2019-11-14 RX ADMIN — Medication 975 MILLIGRAM(S): at 06:00

## 2019-11-14 RX ADMIN — Medication 325 MILLIGRAM(S): at 13:25

## 2019-11-14 RX ADMIN — Medication 500 MILLIGRAM(S): at 13:25

## 2019-11-14 RX ADMIN — Medication 500 MILLIGRAM(S): at 06:06

## 2019-11-14 RX ADMIN — FAMOTIDINE 20 MILLIGRAM(S): 10 INJECTION INTRAVENOUS at 13:25

## 2019-11-14 RX ADMIN — Medication 100 MICROGRAM(S): at 06:05

## 2019-11-14 RX ADMIN — Medication 325 MILLIGRAM(S): at 06:06

## 2019-11-14 RX ADMIN — Medication 500 MILLIGRAM(S): at 21:34

## 2019-11-14 NOTE — PROGRESS NOTE ADULT - ASSESSMENT
49y female POD#8 HD#11 s/p Total Abdominal Hysterectomy, Bilateral Salpingo-oophorectomy, abdominal washout for bilateral TOAs. On admission, patient was found to have R ovarian vein thrombosis with no evidence of PE. Patient is significantly clinically improved. Abdominal cultures are growing ESBL, antibiotic regimen per ID.

## 2019-11-14 NOTE — PROGRESS NOTE ADULT - SUBJECTIVE AND OBJECTIVE BOX
Gyn Progress Note POD#8 HD#11    Subjective:   Pt seen and examined at bedside. No events overnight. Pain well controlled. Patient ambulating. Passing flatus. Tolerating regular diet. Pt denies fever, chills, chest pain, SOB, nausea, vomiting, lightheadedness, dizziness.      Objective:  T(F): 97.9 (19 @ 06:01), Max: 98.9 (19 @ 16:56)  HR: 60 (19 @ 06:01) (60 - 86)  BP: 117/44 (19 @ 06:01) (102/59 - 118/60)  RR: 18 (19 @ 06:01) (16 - 20)  SpO2: 99% (19 @ 06:01) (99% - 100%)    I&O's Summary    2019 07:01  -  2019 07:00  --------------------------------------------------------  IN: 600 mL / OUT: 1000 mL / NET: -400 mL    2019 07:01  -  2019 06:23  --------------------------------------------------------  IN: 1000 mL / OUT: 1600 mL / NET: -600 mL        MEDICATIONS  (STANDING):  acetaminophen   Tablet .. 975 milliGRAM(s) Oral every 6 hours  ascorbic acid 500 milliGRAM(s) Oral three times a day  doxycycline hyclate Capsule 100 milliGRAM(s) Oral every 12 hours  enoxaparin Injectable 40 milliGRAM(s) SubCutaneous daily  famotidine    Tablet 20 milliGRAM(s) Oral daily  ferrous    sulfate 325 milliGRAM(s) Oral three times a day  ibuprofen  Tablet. 600 milliGRAM(s) Oral every 6 hours  lactobacillus acidophilus 1 Tablet(s) Oral daily  levothyroxine 100 MICROGram(s) Oral daily  metoclopramide 10 milliGRAM(s) Oral every 8 hours  ondansetron    Tablet 8 milliGRAM(s) Oral every 8 hours    MEDICATIONS  (PRN):  senna 2 Tablet(s) Oral at bedtime PRN Constipation  simethicone 80 milliGRAM(s) Chew every 8 hours PRN Gas      Physical Exam:  Constitutional: NAD, A+O x3  CV: RRR  Lungs: clear to auscultation bilaterally  Abdomen: soft, nondistended, appropriately tender, no guarding, no rebound, normal bowel sounds  Incision: clean, dry, intact  Extremities: no lower extremity edema or calf tenderness bilaterally; venodynes in place    LABS:                        9.4    7.86  )-----------( 483      ( 2019 05:37 )             28.1       Urinalysis Basic - ( 2019 15:00 )    Color: COLORLESS / Appearance: CLEAR / S.007 / pH: 6.5  Gluc: NEGATIVE / Ketone: NEGATIVE  / Bili: NEGATIVE / Urobili: NORMAL   Blood: NEGATIVE / Protein: NEGATIVE / Nitrite: NEGATIVE   Leuk Esterase: SMALL / RBC: 0-2 / WBC 6-10   Sq Epi: OCC / Non Sq Epi: x / Bacteria: FEW        49y female POD#8 HD#11 s/p Total Abdominal Hysterectomy, Bilateral Salpingo-oophorectomy, abdominal washout for bilateral TOAs. On admission, patient was found to have R ovarian vein thrombosis with no evidence of PE. Patient is significantly clinically improved. Abdominal cultures are growing ESBL, antibiotic regimen per ID.    ***  Neuro: c/w current pain regimen  CV: Hemodynamically stable. Patient is anemic, H/H stable. c/w daily CBC and iron supplementation.  Pulm: Saturating well on RA. Increase incentive spirometry.  GI: Tolerating regular diet. Continue anti-emetics, zofran and simethicone PRN.  : Voiding spontaneously  Heme: Continue Lovenox for DVT ppx and ovarian vein thrombosis. Per heme recs, patient does not require outpatient anticoagulation for OVT and can undergo transabdominal ultrasound in [facility] with her PCP to evaluate for resolution of thrombosis. Venodynes when in bed. Increase OOB.   ID: Currently afebrile, clinically improved, and leukocytosis resolved. Intraop cultures growing ESBL and patient transitioned to ertapenem on  and continued on doxy per ID. As per ID will continue with several more doses of ertapenem. Appreciate final recs for outpatient antibiotic regimen.   Endo: Continue levothyroxine for h/o hypothyroidism.  FEN: SLIV.   Dispo: discharge planning    Nishi Kline, PGY-1. Gyn Progress Note POD#8 HD#11    Subjective:   Pt seen and examined at bedside. No events overnight. Pain well controlled. Patient ambulating. Passing flatus. Tolerating regular diet. Pt denies fever, chills, chest pain, SOB, nausea, vomiting, lightheadedness, dizziness.      Objective:  T(F): 97.9 (19 @ 06:01), Max: 98.9 (19 @ 16:56)  HR: 60 (19 @ 06:01) (60 - 86)  BP: 117/44 (19 @ 06:01) (102/59 - 118/60)  RR: 18 (19 @ 06:01) (16 - 20)  SpO2: 99% (19 @ 06:01) (99% - 100%)    I&O's Summary    2019 07:01  -  2019 07:00  --------------------------------------------------------  IN: 600 mL / OUT: 1000 mL / NET: -400 mL    2019 07:01  -  2019 06:23  --------------------------------------------------------  IN: 1000 mL / OUT: 1600 mL / NET: -600 mL        MEDICATIONS  (STANDING):  acetaminophen   Tablet .. 975 milliGRAM(s) Oral every 6 hours  ascorbic acid 500 milliGRAM(s) Oral three times a day  doxycycline hyclate Capsule 100 milliGRAM(s) Oral every 12 hours  enoxaparin Injectable 40 milliGRAM(s) SubCutaneous daily  famotidine    Tablet 20 milliGRAM(s) Oral daily  ferrous    sulfate 325 milliGRAM(s) Oral three times a day  ibuprofen  Tablet. 600 milliGRAM(s) Oral every 6 hours  lactobacillus acidophilus 1 Tablet(s) Oral daily  levothyroxine 100 MICROGram(s) Oral daily  metoclopramide 10 milliGRAM(s) Oral every 8 hours  ondansetron    Tablet 8 milliGRAM(s) Oral every 8 hours    MEDICATIONS  (PRN):  senna 2 Tablet(s) Oral at bedtime PRN Constipation  simethicone 80 milliGRAM(s) Chew every 8 hours PRN Gas      Physical Exam:  Constitutional: NAD, A+O x3  CV: RRR  Lungs: clear to auscultation bilaterally  Abdomen: soft, nondistended, appropriately tender, no guarding, no rebound, normal bowel sounds  Incision: clean, dry, intact  Extremities: no lower extremity edema or calf tenderness bilaterally; venodynes in place    LABS:                        9.4    7.86  )-----------( 483      ( 2019 05:37 )             28.1       Urinalysis Basic - ( 2019 15:00 )    Color: COLORLESS / Appearance: CLEAR / S.007 / pH: 6.5  Gluc: NEGATIVE / Ketone: NEGATIVE  / Bili: NEGATIVE / Urobili: NORMAL   Blood: NEGATIVE / Protein: NEGATIVE / Nitrite: NEGATIVE   Leuk Esterase: SMALL / RBC: 0-2 / WBC 6-10   Sq Epi: OCC / Non Sq Epi: x / Bacteria: FEW

## 2019-11-14 NOTE — PROGRESS NOTE ADULT - PROBLEM SELECTOR PLAN 1
Neuro: c/w current pain regimen  CV: Hemodynamically stable. Patient is anemic, H/H stable. c/w daily CBC and iron supplementation.  Pulm: Saturating well on RA. Increase incentive spirometry.  GI: Tolerating regular diet. Continue anti-emetics, zofran and simethicone PRN.  : Voiding spontaneously. Dysuria yesterday now resolved. UA normal on 11/13.  Heme: Continue Lovenox for DVT ppx and ovarian vein thrombosis. Per heme recs, patient does not require outpatient anticoagulation for OVT and can undergo transabdominal ultrasound in [facility] with her PCP to evaluate for resolution of thrombosis. Venodynes when in bed. Increase OOB.   ID: Currently afebrile, clinically improved, and leukocytosis resolved. Intraop cultures growing ESBL and patient transitioned to ertapenem on 11/9 and continued on doxy per ID. As per ID final recs, last dose of IV ertapenem on 11/15 with PO doxy until 11/17.   Endo: Continue levothyroxine for h/o hypothyroidism.  FEN: SLIV  Dispo: Anticipate discharge tomorrow    Nishi Kline, PGY-1.

## 2019-11-15 ENCOUNTER — TRANSCRIPTION ENCOUNTER (OUTPATIENT)
Age: 49
End: 2019-11-15

## 2019-11-15 VITALS
DIASTOLIC BLOOD PRESSURE: 51 MMHG | OXYGEN SATURATION: 97 % | HEART RATE: 74 BPM | RESPIRATION RATE: 16 BRPM | SYSTOLIC BLOOD PRESSURE: 100 MMHG | TEMPERATURE: 99 F

## 2019-11-15 RX ADMIN — Medication 975 MILLIGRAM(S): at 05:42

## 2019-11-15 RX ADMIN — Medication 500 MILLIGRAM(S): at 05:43

## 2019-11-15 RX ADMIN — Medication 325 MILLIGRAM(S): at 05:42

## 2019-11-15 RX ADMIN — Medication 100 MILLIGRAM(S): at 05:42

## 2019-11-15 RX ADMIN — Medication 100 MICROGRAM(S): at 05:43

## 2019-11-15 NOTE — PROGRESS NOTE ADULT - PROBLEM SELECTOR PROBLEM 1
S/P hysterectomy with oophorectomy
Tubo-ovarian abscess

## 2019-11-15 NOTE — DISCHARGE NOTE NURSING/CASE MANAGEMENT/SOCIAL WORK - PATIENT PORTAL LINK FT
You can access the FollowMyHealth Patient Portal offered by [facility] by registering at the following website: [email or web address]. By joining [employer]’s FollowMyHealth portal, you will also be able to view your health information using other applications (apps) compatible with our system.

## 2019-11-15 NOTE — PROGRESS NOTE ADULT - SUBJECTIVE AND OBJECTIVE BOX
Gyn Progress Note POD#9 HD#12    Subjective:   Pt seen and examined at bedside. No events overnight. Pain well controlled. Patient ambulating. Passing flatus. Tolerating regular diet. Pt denies fever, chills, chest pain, SOB, nausea, vomiting, lightheadedness, dizziness. Patient's ready to go home today.    Objective:  T(F): 98.6 (11-15-19 @ 05:40), Max: 98.8 (19 @ 18:04)  HR: 74 (11-15-19 @ 05:40) (70 - 85)  BP: 100/51 (11-15-19 @ 05:40) (100/51 - 108/63)  RR: 16 (11-15-19 @ 05:40) (16 - 17)  SpO2: 97% (11-15-19 @ 05:40) (97% - 100%)    I&O's Summary    2019 07:01  -  15 Nov 2019 07:00  --------------------------------------------------------  IN: 0 mL / OUT: 0 mL / NET: 0 mL      MEDICATIONS  (STANDING):  acetaminophen   Tablet .. 975 milliGRAM(s) Oral every 6 hours  ascorbic acid 500 milliGRAM(s) Oral three times a day  doxycycline hyclate Capsule 100 milliGRAM(s) Oral every 12 hours  enoxaparin Injectable 40 milliGRAM(s) SubCutaneous daily  famotidine    Tablet 20 milliGRAM(s) Oral daily  ferrous    sulfate 325 milliGRAM(s) Oral three times a day  ibuprofen  Tablet. 600 milliGRAM(s) Oral every 6 hours  lactobacillus acidophilus 1 Tablet(s) Oral daily  levothyroxine 100 MICROGram(s) Oral daily  metoclopramide 10 milliGRAM(s) Oral every 8 hours  ondansetron    Tablet 8 milliGRAM(s) Oral every 8 hours    MEDICATIONS  (PRN):  polyethylene glycol 3350 17 Gram(s) Oral daily PRN Constipation  senna 2 Tablet(s) Oral at bedtime PRN Constipation  simethicone 80 milliGRAM(s) Chew every 8 hours PRN Gas      Physical Exam:  Constitutional: NAD, A+O x3  CV: RRR  Lungs: clear to auscultation bilaterally  Abdomen: soft, nondistended, no guarding, no rebound, normal bowel sounds  Incision: clean, dry, intact  Extremities: no lower extremity edema or calf tenderness bilaterally; venodynes in place    LABS:  No labs today      Urinalysis Basic - ( 2019 15:00 )    Color: COLORLESS / Appearance: CLEAR / S.007 / pH: 6.5  Gluc: NEGATIVE / Ketone: NEGATIVE  / Bili: NEGATIVE / Urobili: NORMAL   Blood: NEGATIVE / Protein: NEGATIVE / Nitrite: NEGATIVE   Leuk Esterase: SMALL / RBC: 0-2 / WBC 6-10   Sq Epi: OCC / Non Sq Epi: x / Bacteria: FEW

## 2019-11-15 NOTE — PROGRESS NOTE ADULT - ASSESSMENT
49y female s/p Total Abdominal Hysterectomy, Bilateral Salpingo-oophorectomy, abdominal washout for bilateral TOAs. On admission, patient was found to have R ovarian vein thrombosis with no evidence of PE. Patient is significantly clinically improved. Abdominal cultures are growing ESBL, antibiotic regimen per ID.

## 2019-11-15 NOTE — PROGRESS NOTE ADULT - ATTENDING COMMENTS
Patient seen and examined by me.  Agree with above assessment and plan.
agree with above findings and plan of care
patient seen and examined at bedside. agree with above assessment and plan
patient seen and examined at bedside. agree with above assessment and plan
Pt reports feeling slightly better than last PM but WBC unchanged despite IV Abx for 48hrs.  I discussed clinical picture with patient and her friend and my concern for minimal improvement and inability to undergo IR drainage,  I am concerned for worsening status and development of sepsis.                     10.7   18.56 )-----------( 327      ( 06 Nov 2019 07:44 )             33.3   Pt agrees to emergent LISA BSO and removal of any other diseased or damaged tissue.  R/A/B of procedure d/w pt including but not limited to infection, bleeding and injury to bladder/bowel/ureters/vessels.  Pt expressed understanding of above.  Discussed case with IR in regards to management of right ovarian vein thrombosis; IVC filter is not indicated.  Dr Bean and her gyn onc team on back-up.
Pt seen and examined by me.  Reports decreased appetite and no flatus but no nausea or vomiting.  Pt reports minimal pain and good control.  She reports ambulating.  Agree with above assessment and exam.  Advised PO intake as tolerated and ambulation
Patient seen and examined by me.  Agree with above assessment and plan.  Dizziness improved.  Ambulating. rpt Cbc                       8.2    11.29 )-----------( 369      ( 09 Nov 2019 12:40 ) --stable             25.4     ID f/up and recommendations appreciated.  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

## 2019-11-15 NOTE — PROGRESS NOTE ADULT - PROBLEM SELECTOR PLAN 1
Neuro: c/w current pain regimen  CV: Hemodynamically stable. Patient is anemic, H/H stable. Continue with iron supplementation.  Pulm: Saturating well on RA. Increase incentive spirometry.  GI: Tolerating regular diet. Continue anti-emetics, zofran and simethicone PRN.  : Voiding spontaneously.   Heme: Continue Lovenox for DVT ppx and ovarian vein thrombosis. Per heme recs, patient does not require outpatient anticoagulation for OVT and can undergo transabdominal ultrasound in [facility] with her PCP to evaluate for resolution of thrombosis. Venodynes when in bed. Increase OOB.   ID: Currently afebrile, clinically improved, and leukocytosis resolved. Intraop cultures growing ESBL and patient transitioned to ertapenem on 11/9 and continued on doxy per ID. As per ID final recs, last dose of IV ertapenem on 11/15 with PO doxy until 11/17.   Endo: Continue levothyroxine for h/o hypothyroidism.  FEN: SLIV  Dispo: discharge home today    Nishi Kline, PGY-1

## 2019-11-25 ENCOUNTER — APPOINTMENT (OUTPATIENT)
Dept: OBGYN | Facility: HOSPITAL | Age: 49
End: 2019-11-25
Payer: SELF-PAY

## 2019-11-25 ENCOUNTER — OUTPATIENT (OUTPATIENT)
Dept: OUTPATIENT SERVICES | Facility: HOSPITAL | Age: 49
LOS: 1 days | End: 2019-11-25

## 2019-11-25 ENCOUNTER — LABORATORY RESULT (OUTPATIENT)
Age: 49
End: 2019-11-25

## 2019-11-25 VITALS
SYSTOLIC BLOOD PRESSURE: 99 MMHG | WEIGHT: 143.3 LBS | BODY MASS INDEX: 21.97 KG/M2 | HEIGHT: 67.72 IN | HEART RATE: 97 BPM | DIASTOLIC BLOOD PRESSURE: 70 MMHG

## 2019-11-25 DIAGNOSIS — Z98.51 TUBAL LIGATION STATUS: Chronic | ICD-10-CM

## 2019-11-25 DIAGNOSIS — Z09 ENCOUNTER FOR FOLLOW-UP EXAMINATION AFTER COMPLETED TREATMENT FOR CONDITIONS OTHER THAN MALIGNANT NEOPLASM: ICD-10-CM

## 2019-11-25 DIAGNOSIS — R39.9 UNSPECIFIED SYMPTOMS AND SIGNS INVOLVING THE GENITOURINARY SYSTEM: ICD-10-CM

## 2019-11-25 LAB
APPEARANCE UR: SIGNIFICANT CHANGE UP
BACTERIA # UR AUTO: NEGATIVE — SIGNIFICANT CHANGE UP
BILIRUB UR-MCNC: NEGATIVE — SIGNIFICANT CHANGE UP
BLOOD UR QL VISUAL: NEGATIVE — SIGNIFICANT CHANGE UP
COLOR SPEC: SIGNIFICANT CHANGE UP
GLUCOSE UR-MCNC: NEGATIVE — SIGNIFICANT CHANGE UP
HYALINE CASTS # UR AUTO: NEGATIVE — SIGNIFICANT CHANGE UP
KETONES UR-MCNC: NEGATIVE — SIGNIFICANT CHANGE UP
LEUKOCYTE ESTERASE UR-ACNC: NEGATIVE — SIGNIFICANT CHANGE UP
NITRITE UR-MCNC: NEGATIVE — SIGNIFICANT CHANGE UP
PH UR: 7.5 — SIGNIFICANT CHANGE UP (ref 5–8)
PROT UR-MCNC: 10 — SIGNIFICANT CHANGE UP
RBC CASTS # UR COMP ASSIST: SIGNIFICANT CHANGE UP (ref 0–?)
SP GR SPEC: 1.02 — SIGNIFICANT CHANGE UP (ref 1–1.04)
SQUAMOUS # UR AUTO: SIGNIFICANT CHANGE UP
UROBILINOGEN FLD QL: NORMAL — SIGNIFICANT CHANGE UP
WBC UR QL: SIGNIFICANT CHANGE UP (ref 0–?)

## 2019-11-25 PROCEDURE — ZZZZZ: CPT | Mod: GC

## 2019-11-25 RX ORDER — LEVOTHYROXINE SODIUM 0.1 MG/1
100 TABLET ORAL DAILY
Qty: 30 | Refills: 0 | Status: ACTIVE | COMMUNITY
Start: 2019-11-25 | End: 1900-01-01

## 2019-11-26 DIAGNOSIS — Z09 ENCOUNTER FOR FOLLOW-UP EXAMINATION AFTER COMPLETED TREATMENT FOR CONDITIONS OTHER THAN MALIGNANT NEOPLASM: ICD-10-CM

## 2019-11-26 LAB — SPECIMEN SOURCE: SIGNIFICANT CHANGE UP

## 2019-11-27 LAB — BACTERIA UR CULT: SIGNIFICANT CHANGE UP

## 2023-04-07 NOTE — DISCHARGE NOTE PROVIDER - NSDCMRMEDTOKEN_GEN_ALL_CORE_FT
Reason for Visit: Vib Stim/KAREN    Diagnosis: SCI    Orders/Procedures/Records: in system    Contact Patient: n/a    Rooming Requirements: normal      Ivette Parikh LPN  04/07/23  2:39 PM   doxycycline hyclate 100 mg oral capsule: 1 cap(s) orally 2 times a day MDD:2

## 2023-04-30 NOTE — PATIENT PROFILE ADULT - NSPROPATIENTLACTATING_GEN_A_NUR
"Autumn Arita (Irene) was seen and treated in our emergency department on 4/30/2023.  She may return to work on 05/02/2023.       If you have any questions or concerns, please don't hesitate to call.       RN    " no